# Patient Record
Sex: FEMALE | Race: WHITE | Employment: OTHER | URBAN - METROPOLITAN AREA
[De-identification: names, ages, dates, MRNs, and addresses within clinical notes are randomized per-mention and may not be internally consistent; named-entity substitution may affect disease eponyms.]

---

## 2017-08-16 LAB — COLONOSCOPY, EXTERNAL: NORMAL

## 2020-07-16 PROBLEM — I10 ESSENTIAL HYPERTENSION, MALIGNANT: Status: ACTIVE | Noted: 2020-07-16

## 2020-07-16 PROBLEM — E11.65 TYPE II OR UNSPECIFIED TYPE DIABETES MELLITUS WITH RENAL MANIFESTATIONS, UNCONTROLLED(250.42) (HCC): Status: ACTIVE | Noted: 2020-07-16

## 2020-07-16 PROBLEM — E78.2 MIXED HYPERLIPIDEMIA: Status: ACTIVE | Noted: 2020-07-16

## 2020-07-16 PROBLEM — E11.29 TYPE II OR UNSPECIFIED TYPE DIABETES MELLITUS WITH RENAL MANIFESTATIONS, UNCONTROLLED(250.42) (HCC): Status: ACTIVE | Noted: 2020-07-16

## 2020-07-16 RX ORDER — BENAZEPRIL HYDROCHLORIDE 20 MG/1
20 TABLET ORAL DAILY
COMMUNITY
End: 2020-12-22

## 2020-07-16 RX ORDER — UREA 10 %
100 LOTION (ML) TOPICAL DAILY
COMMUNITY

## 2020-07-16 RX ORDER — INSULIN DETEMIR 100 [IU]/ML
INJECTION, SOLUTION SUBCUTANEOUS
COMMUNITY
End: 2020-07-28 | Stop reason: SDUPTHER

## 2020-07-16 RX ORDER — GUAIFENESIN 100 MG/5ML
81 LIQUID (ML) ORAL DAILY
COMMUNITY
End: 2021-01-12

## 2020-07-16 RX ORDER — SIMVASTATIN 40 MG/1
TABLET, FILM COATED ORAL
COMMUNITY
End: 2020-12-22

## 2020-07-16 RX ORDER — OMEGA-3-ACID ETHYL ESTERS 1 G/1
2 CAPSULE, LIQUID FILLED ORAL 2 TIMES DAILY
COMMUNITY

## 2020-07-16 RX ORDER — CETIRIZINE HYDROCHLORIDE 10 MG/1
CAPSULE, LIQUID FILLED ORAL
COMMUNITY

## 2020-07-16 RX ORDER — METFORMIN HYDROCHLORIDE 1000 MG/1
1000 TABLET ORAL 2 TIMES DAILY WITH MEALS
COMMUNITY
End: 2020-07-28 | Stop reason: SDUPTHER

## 2020-07-16 RX ORDER — LIRAGLUTIDE 6 MG/ML
0.6 INJECTION SUBCUTANEOUS
COMMUNITY
End: 2020-07-28 | Stop reason: SDUPTHER

## 2020-07-28 ENCOUNTER — OFFICE VISIT (OUTPATIENT)
Dept: ENDOCRINOLOGY | Age: 70
End: 2020-07-28
Payer: COMMERCIAL

## 2020-07-28 VITALS
TEMPERATURE: 98.4 F | HEIGHT: 67 IN | HEART RATE: 105 BPM | WEIGHT: 194.9 LBS | BODY MASS INDEX: 30.59 KG/M2 | SYSTOLIC BLOOD PRESSURE: 121 MMHG | OXYGEN SATURATION: 97 % | DIASTOLIC BLOOD PRESSURE: 78 MMHG

## 2020-07-28 DIAGNOSIS — E11.65 UNCONTROLLED TYPE 2 DIABETES MELLITUS WITH HYPERGLYCEMIA (HCC): Primary | ICD-10-CM

## 2020-07-28 LAB
GLUCOSE POC: 158 MG/DL
HBA1C MFR BLD HPLC: 9.1 %

## 2020-07-28 PROCEDURE — 83036 HEMOGLOBIN GLYCOSYLATED A1C: CPT | Performed by: INTERNAL MEDICINE

## 2020-07-28 PROCEDURE — 82962 GLUCOSE BLOOD TEST: CPT | Performed by: INTERNAL MEDICINE

## 2020-07-28 PROCEDURE — 99214 OFFICE O/P EST MOD 30 MIN: CPT | Performed by: INTERNAL MEDICINE

## 2020-07-28 RX ORDER — LIRAGLUTIDE 6 MG/ML
1.8 INJECTION SUBCUTANEOUS DAILY
Qty: 3 ADJUSTABLE DOSE PRE-FILLED PEN SYRINGE | Refills: 4 | Status: SHIPPED | OUTPATIENT
Start: 2020-07-28 | End: 2020-12-21

## 2020-07-28 RX ORDER — LANCETS
EACH MISCELLANEOUS
Qty: 50 EACH | Refills: 5 | Status: SHIPPED | OUTPATIENT
Start: 2020-07-28 | End: 2021-02-02 | Stop reason: SDUPTHER

## 2020-07-28 RX ORDER — INSULIN DETEMIR 100 [IU]/ML
30 INJECTION, SOLUTION SUBCUTANEOUS
Qty: 3 ADJUSTABLE DOSE PRE-FILLED PEN SYRINGE | Refills: 4 | Status: SHIPPED | OUTPATIENT
Start: 2020-07-28 | End: 2020-12-04

## 2020-07-28 RX ORDER — GLIMEPIRIDE 1 MG/1
1 TABLET ORAL 2 TIMES DAILY
Qty: 60 TAB | Refills: 4 | Status: SHIPPED | OUTPATIENT
Start: 2020-07-28 | End: 2020-08-27

## 2020-07-28 RX ORDER — METFORMIN HYDROCHLORIDE 1000 MG/1
1000 TABLET ORAL 2 TIMES DAILY WITH MEALS
Qty: 60 TAB | Refills: 4 | Status: SHIPPED | OUTPATIENT
Start: 2020-07-28 | End: 2020-12-21

## 2020-07-28 NOTE — PATIENT INSTRUCTIONS
Start glimepiride 1 mg twice a day Continue rest of the medications. Check glucose before breakfast and dinner. Start walking indoor everyday for exercise.

## 2020-07-28 NOTE — PROGRESS NOTES
History and Physical    Patient: Arabella Garza MRN: [id number]  SSN: [id number]    YOB: 1950  Age: 72 y.o. Sex: female      Subjective:      Arabella Garza is a 72 y.o. female with past medical history of hypertension, hyperlipidemia is here for follow-up of type 2 diabetes mellitus. She remains in primary care of RUTHY Davis. At the last visit we noticed improvement in glucose control. So we continued Levemir 30 units at bedtime, Victoza 1.8 mg daily, metformin 1000 mg twice a day, Jardiance was increased from 10 mg to 25 mg daily and we totally stopped glimepiride. Patient tells me that her office has been closed, so she has not been physically active like before. She eats 3 meals and 2 snacks every day. Snacking on nuts, cheese. Not drinking any sugary beverages. She checks blood glucose only fasting in the morning. Glucometer reading: Patient is checking blood glucose fasting.   Readings are ranging from 97 to 174 mg/dL    Updated diabetes history:  · Diagnosis: 20 years    · Current treatment: Levemir 30 units at bedtime, Victoza 1.8 mg daily, metformin 1000 mg twice a day, Jardiance 25 mg daily    · Past treatment: Glimepiride    · Glucose checks: fasting daily 200s    · Hyperglycemia: yes    · Hypoglycemia: no    · Meals per day: 3, breakfast: eggs and toast, lunch: salad and sandwich, dinner: meat and veg, starch, bedtime snacks: pie, cereal, snacks: nuts cheese sticks     · Exercise: unable to    · DM related hospitalizations: no        Complications of DM:    · CAD: no    · CVA: no    · PVD: no    · Amputations: no     · Retinopathy: no; last exam was 10/2019, goes every 6 months    · Gastropathy: no    · Nephropathy: no    · Neuropathy: yes        Medications:    · Statin: simvastatin 40    · ACE-I: benazepril    · ASA: yes        · Diabetes education: long time back    Past Medical History:   Diagnosis Date    Essential hypertension, malignant 7/16/2020    Mixed hyperlipidemia 7/16/2020    Type II or unspecified type diabetes mellitus with renal manifestations, uncontrolled(250.42) ([facility] [address] 7/16/2020     Past Surgical History:   Procedure Laterality Date    HX TONSILLECTOMY      HX TUBAL LIGATION        Family History   Problem Relation Age of Onset    Heart Disease Mother     Cancer Father     Diabetes Sister      Social History     Tobacco Use    Smoking status: Never Smoker    Smokeless tobacco: Never Used   Substance Use Topics    Alcohol use: Never     Frequency: Never      Prior to Admission medications    Medication Sig Start Date End Date Taking? Authorizing Provider   empagliflozin (Jardiance) 25 mg tablet Take 1 Tab by mouth daily for 30 days. 7/28/20 8/27/20 Yes Jose De Jesus Farnsworth MD   liraglutide (Victoza 2-Edilson) 0.6 mg/0.1 mL (18 mg/3 mL) pnij 1.8 mg by SubCUTAneous route daily for 30 days. 7/28/20 8/27/20 Yes Jose De Jesus Farnsworth MD   metFORMIN (GLUCOPHAGE) 1,000 mg tablet Take 1 Tab by mouth two (2) times daily (with meals) for 30 days. 7/28/20 8/27/20 Yes Jose De Jesus Farnsworth MD   glimepiride (AMARYL) 1 mg tablet Take 1 Tab by mouth two (2) times a day for 30 days. 7/28/20 8/27/20 Yes Jose De Jesus Farnsworth MD   glucose blood VI test strips (ASCENSIA AUTODISC VI, ONE TOUCH ULTRA TEST VI) strip Use to check glucose twice a day 7/28/20  Yes Jose De Jesus Farnsworth MD   lancets misc Use to check glucose twice a day 7/28/20  Yes Jose De Jesus Farnsworth MD   insulin detemir U-100 (Levemir FlexTouch U-100 Insuln) 100 unit/mL (3 mL) inpn 30 Units by SubCUTAneous route nightly. 7/28/20  Yes Jose De Jesus Farnsworth MD   aspirin 81 mg chewable tablet Take 81 mg by mouth daily. Yes Provider, Historical   benazepriL (LOTENSIN) 20 mg tablet Take 20 mg by mouth daily. Yes Provider, Historical   omega-3 acid ethyl esters (LOVAZA) 1 gram capsule Take 2 g by mouth two (2) times a day. Yes Provider, Historical   simvastatin (ZOCOR) 40 mg tablet Take  by mouth nightly.    Yes Provider, Historical   cyanocobalamin (Vitamin B-12) 100 mcg tablet Take 100 mcg by mouth daily. Yes Provider, Historical   Cetirizine (ZyrTEC) 10 mg cap Take  by mouth. Yes Provider, Historical        Allergies   Allergen Reactions    Flonase [Fluticasone Propionate] Unknown (comments)    Nasonex [Mometasone] Unknown (comments)       Review of Systems:  ROS    A comprehensive review of systems was preformed and it is negative except mentioned in HPI    Objective:     Vitals:    07/28/20 1544   BP: 121/78   Pulse: (!) 105   Temp: 98.4 °F (36.9 °C)   SpO2: 97%   Weight: 194 lb 14.4 oz (88.4 kg)   Height: 5' 7\" (1.702 m)        Physical Exam:    Physical Exam  Vitals signs and nursing note reviewed. Constitutional:       Appearance: Normal appearance. HENT:      Head: Normocephalic and atraumatic. Eyes:      Extraocular Movements: Extraocular movements intact. Cardiovascular:      Rate and Rhythm: Normal rate and regular rhythm. Pulses: Normal pulses. Heart sounds: Normal heart sounds. Neurological:      General: No focal deficit present. Mental Status: She is alert. Psychiatric:         Mood and Affect: Mood normal.         Behavior: Behavior normal.         Thought Content: Thought content normal.         Judgment: Judgment normal.          Labs and Imaging:  Results for Chani Salazar (MRN [id number]) as of 7/28/2020 16:00   Ref.  Range 7/28/2020 15:51 7/28/2020 15:53   GLUCOSE,FAST - POC Latest Units: mg/dL 158    Hemoglobin A1c (POC) Latest Units: %  9.1     Last 3 Recorded Weights in this Encounter    07/28/20 1544   Weight: 194 lb 14.4 oz (88.4 kg)        No results found for: HBA1C, HGBE8, FQF1XCOQ, XOV4GVDP, YMJ0EUFG     Assessment:     Patient Active Problem List   Diagnosis Code    Essential hypertension, malignant I10    Mixed hyperlipidemia E78.2    Type II or unspecified type diabetes mellitus with renal manifestations, uncontrolled(250.42) ([facility] [address] E11.29, E11.65           Plan:     type II diabetes mellitus uncontrolled   Hemoglobin A1c was 8.4% on 11222019, 7.7% on 2020, 9.1% today. Fingerstick blood glucose is 158 mg/dL in my office today. Up to date with diabetes related annual labs: yes  Up to date with diabetic eye exam: yes    plan:  Glucose control has worsened. Fasting glucose looks adequate since past couple weeks. I am not sure if patient had poor glycemic control a couple months back which is now getting better. Also it is possible that patient has prandial hyperglycemia which is not getting captured because she checks only fasting blood glucose. Encouraged patient to continue to follow diabetic diet. Advised patient to start walking every day for exercise. Continue Levemir 30 units daily, metformin 1000 mg twice a day, Jardiance 25 mg daily. Restart glimepiride 1 mg twice a day before meals. Check blood glucose before breakfast and before dinner. mixed hyperlipidemia   LDL 54 in 2019. continue simvastatin 40 mg daily    essential hypertension   blood pressure is acceptable today. No microalbuminuria. Orders Placed This Encounter    AMB POC GLUCOSE BLOOD, BY GLUCOSE MONITORING DEVICE    AMB POC HEMOGLOBIN A1C    empagliflozin (Jardiance) 25 mg tablet     Sig: Take 1 Tab by mouth daily for 30 days. Dispense:  30 Tab     Refill:  4    liraglutide (Victoza 2-Edilson) 0.6 mg/0.1 mL (18 mg/3 mL) pnij     Si.8 mg by SubCUTAneous route daily for 30 days. Dispense:  3 Adjustable Dose Pre-filled Pen Syringe     Refill:  4    metFORMIN (GLUCOPHAGE) 1,000 mg tablet     Sig: Take 1 Tab by mouth two (2) times daily (with meals) for 30 days. Dispense:  60 Tab     Refill:  4    glimepiride (AMARYL) 1 mg tablet     Sig: Take 1 Tab by mouth two (2) times a day for 30 days.      Dispense:  60 Tab     Refill:  4    glucose blood VI test strips (ASCENSIA AUTODISC VI, ONE TOUCH ULTRA TEST VI) strip     Sig: Use to check glucose twice a day     Dispense:  50 Strip     Refill:  5  lancets misc     Sig: Use to check glucose twice a day     Dispense:  50 Each     Refill:  5    insulin detemir U-100 (Levemir FlexTouch U-100 Insuln) 100 unit/mL (3 mL) inpn     Si Units by SubCUTAneous route nightly.      Dispense:  3 Adjustable Dose Pre-filled Pen Syringe     Refill:  4        Signed By: Aurora New MD     2020      Return to clinic 4 months

## 2020-08-04 ENCOUNTER — TELEPHONE (OUTPATIENT)
Dept: FAMILY MEDICINE CLINIC | Age: 70
End: 2020-08-04

## 2020-08-04 NOTE — TELEPHONE ENCOUNTER
Pt called stating that she's had a diabetic eye bleed in the (R) eye and Dr. Jacey Alvarado needs to know she can stop the 81mg Aspirin

## 2020-12-04 ENCOUNTER — TELEPHONE (OUTPATIENT)
Dept: INFECTIOUS DISEASES | Age: 70
End: 2020-12-04

## 2020-12-21 DIAGNOSIS — E11.65 UNCONTROLLED TYPE 2 DIABETES MELLITUS WITH HYPERGLYCEMIA (HCC): ICD-10-CM

## 2020-12-21 RX ORDER — METFORMIN HYDROCHLORIDE 1000 MG/1
TABLET ORAL
Qty: 60 TAB | Refills: 0 | Status: SHIPPED | OUTPATIENT
Start: 2020-12-21 | End: 2021-01-13 | Stop reason: SDUPTHER

## 2020-12-21 RX ORDER — EMPAGLIFLOZIN 25 MG/1
TABLET, FILM COATED ORAL
Qty: 30 TAB | Refills: 0 | Status: SHIPPED | OUTPATIENT
Start: 2020-12-21 | End: 2021-01-13 | Stop reason: SDUPTHER

## 2020-12-21 RX ORDER — LIRAGLUTIDE 6 MG/ML
INJECTION SUBCUTANEOUS
Qty: 9 ML | Refills: 0 | Status: SHIPPED | OUTPATIENT
Start: 2020-12-21 | End: 2021-02-02 | Stop reason: SDUPTHER

## 2021-01-12 ENCOUNTER — OFFICE VISIT (OUTPATIENT)
Dept: FAMILY MEDICINE CLINIC | Age: 71
End: 2021-01-12
Payer: MEDICARE

## 2021-01-12 VITALS
BODY MASS INDEX: 31.39 KG/M2 | HEIGHT: 67 IN | WEIGHT: 200 LBS | TEMPERATURE: 97.1 F | SYSTOLIC BLOOD PRESSURE: 120 MMHG | HEART RATE: 91 BPM | DIASTOLIC BLOOD PRESSURE: 72 MMHG | OXYGEN SATURATION: 96 %

## 2021-01-12 DIAGNOSIS — Z13.39 ALCOHOL SCREENING: ICD-10-CM

## 2021-01-12 DIAGNOSIS — Z00.00 MEDICARE ANNUAL WELLNESS VISIT, INITIAL: Primary | ICD-10-CM

## 2021-01-12 DIAGNOSIS — Z13.6 SCREENING FOR CARDIOVASCULAR CONDITION: ICD-10-CM

## 2021-01-12 DIAGNOSIS — I10 ESSENTIAL HYPERTENSION, MALIGNANT: ICD-10-CM

## 2021-01-12 DIAGNOSIS — E53.8 COBALAMIN DEFICIENCY: ICD-10-CM

## 2021-01-12 DIAGNOSIS — Z91.81 AT LOW RISK FOR FALL: ICD-10-CM

## 2021-01-12 DIAGNOSIS — E78.2 MIXED HYPERLIPIDEMIA: ICD-10-CM

## 2021-01-12 DIAGNOSIS — Z71.89 ADVANCED DIRECTIVES, COUNSELING/DISCUSSION: ICD-10-CM

## 2021-01-12 DIAGNOSIS — Z12.31 ENCOUNTER FOR SCREENING MAMMOGRAM FOR MALIGNANT NEOPLASM OF BREAST: ICD-10-CM

## 2021-01-12 DIAGNOSIS — Z13.31 DEPRESSION SCREENING: ICD-10-CM

## 2021-01-12 DIAGNOSIS — E66.09 CLASS 1 OBESITY DUE TO EXCESS CALORIES WITH SERIOUS COMORBIDITY AND BODY MASS INDEX (BMI) OF 31.0 TO 31.9 IN ADULT: ICD-10-CM

## 2021-01-12 PROCEDURE — G8752 SYS BP LESS 140: HCPCS | Performed by: NURSE PRACTITIONER

## 2021-01-12 PROCEDURE — G9899 SCRN MAM PERF RSLTS DOC: HCPCS | Performed by: NURSE PRACTITIONER

## 2021-01-12 PROCEDURE — 3017F COLORECTAL CA SCREEN DOC REV: CPT | Performed by: NURSE PRACTITIONER

## 2021-01-12 PROCEDURE — 99214 OFFICE O/P EST MOD 30 MIN: CPT | Performed by: NURSE PRACTITIONER

## 2021-01-12 PROCEDURE — G8400 PT W/DXA NO RESULTS DOC: HCPCS | Performed by: NURSE PRACTITIONER

## 2021-01-12 PROCEDURE — G8536 NO DOC ELDER MAL SCRN: HCPCS | Performed by: NURSE PRACTITIONER

## 2021-01-12 PROCEDURE — 1090F PRES/ABSN URINE INCON ASSESS: CPT | Performed by: NURSE PRACTITIONER

## 2021-01-12 PROCEDURE — 1101F PT FALLS ASSESS-DOCD LE1/YR: CPT | Performed by: NURSE PRACTITIONER

## 2021-01-12 PROCEDURE — G8427 DOCREV CUR MEDS BY ELIG CLIN: HCPCS | Performed by: NURSE PRACTITIONER

## 2021-01-12 PROCEDURE — G8432 DEP SCR NOT DOC, RNG: HCPCS | Performed by: NURSE PRACTITIONER

## 2021-01-12 PROCEDURE — G8417 CALC BMI ABV UP PARAM F/U: HCPCS | Performed by: NURSE PRACTITIONER

## 2021-01-12 PROCEDURE — G0402 INITIAL PREVENTIVE EXAM: HCPCS | Performed by: NURSE PRACTITIONER

## 2021-01-12 PROCEDURE — G8754 DIAS BP LESS 90: HCPCS | Performed by: NURSE PRACTITIONER

## 2021-01-12 RX ORDER — GLIMEPIRIDE 1 MG/1
TABLET ORAL
COMMUNITY
Start: 2020-12-22 | End: 2021-01-12

## 2021-01-12 RX ORDER — GLIMEPIRIDE 1 MG/1
1 TABLET ORAL 2 TIMES DAILY
COMMUNITY
End: 2021-02-02 | Stop reason: SDUPTHER

## 2021-01-12 NOTE — PROGRESS NOTES
Medicare Wellness Exam:    Chief Complaint   Patient presents with    Annual Wellness Visit     she is a 72y.o. year old female who presents for evaluation for their Medicare Wellness Visit. Patient presents for Medicare wellness and management of hypertension and hyperlipidemia. Medications reviewed, taking as prescribed with no known side effects. Follows with endocrinology for management of type 2 diabetes including all lab work. Simvastatin daily in the evening for hyperlipidemia. Moderate intake of fat/cholesterol. Management of HTN-  Current meds: Benazepril  Home BP readings: 120/70  High salt intake: no  Stays hydrated: no, mostly diet coke and tea  Regular exercise: no      Fall Screen is completed and assessed=yes  Depression Screen is completed and assessed=yes  Medication list reviewed and adjusted for accuracy=yes  Immunizations reviewed and updated=yes  Health/Preventative Screenings reviewed and updated=yes  ADL Functions reviewed=yes  MiniCog score= 5/5 (2points for clock, 3/3 for recall)  See scanned medicare wellness documents for full details. Patient Active Problem List    Diagnosis    Cobalamin deficiency    Moderate nonproliferative diabetic retinopathy of both eyes without macular edema associated with type 2 diabetes mellitus ([address]    Essential hypertension, malignant    Mixed hyperlipidemia    Type II or unspecified type diabetes mellitus with renal manifestations, uncontrolled(250.42) ([address]       Reviewed PmHx, RxHx, FmHx, SocHx, AllgHx and updated and dated in the chart. Review of Systems   Constitutional: Negative for chills, fever and weight loss. HENT: Negative for congestion, ear pain, hearing loss, sinus pain and sore throat. Denies difficulty swallowing. Eyes: Negative for blurred vision. Respiratory: Negative for cough, shortness of breath and wheezing.     Cardiovascular: Negative for chest pain, palpitations, claudication and leg swelling. Gastrointestinal: Negative for abdominal pain, constipation, diarrhea and heartburn. Genitourinary: Negative for dysuria. Musculoskeletal: Positive for back pain (low, has seen ortho and PT in the past). Negative for joint pain and myalgias. Neurological: Negative for dizziness, tingling, weakness and headaches. Psychiatric/Behavioral: Negative for depression. The patient is not nervous/anxious. Objective:     Vitals:    01/12/21 1118   BP: 120/72   Pulse: 91   Temp: 97.1 °F (36.2 °C)   TempSrc: Temporal   SpO2: 96%   Weight: 200 lb (90.7 kg)   Height: 5' 7\" (1.702 m)     Physical Exam  Vitals signs and nursing note reviewed. Constitutional:       General: She is not in acute distress. Appearance: Normal appearance. She is obese. HENT:      Head: Normocephalic. Eyes:      Extraocular Movements: Extraocular movements intact. Neck:      Musculoskeletal: Neck supple. Thyroid: No thyroid mass, thyromegaly or thyroid tenderness. Cardiovascular:      Rate and Rhythm: Normal rate and regular rhythm. Heart sounds: Normal heart sounds. Pulmonary:      Effort: Pulmonary effort is normal.      Breath sounds: Normal breath sounds. Musculoskeletal: Normal range of motion. Right lower leg: No edema. Left lower leg: No edema. Lymphadenopathy:      Cervical: No cervical adenopathy. Upper Body:      Right upper body: No supraclavicular adenopathy. Left upper body: No supraclavicular adenopathy. Skin:     General: Skin is warm and dry. Neurological:      Mental Status: She is alert and oriented to person, place, and time. Psychiatric:         Mood and Affect: Mood normal.         Behavior: Behavior normal.          Assessment/ Plan:   Diagnoses and all orders for this visit:    1. Medicare annual wellness visit, initial  W exam completed as documented. -     VISUAL ACUITY CHECK    2. Depression screening  Negative depression screening.     3. Alcohol screening  Low risk for alcohol misuse. 4. At low risk for fall  Low fall risk. 5. Advanced directives, counseling/discussion  Advised to bring a copy of her advance directive to the office at her convenience to have this part of her permanent medical record. 6. Screening for cardiovascular condition  Declines EKG today. 7. Class 1 obesity due to excess calories with serious comorbidity and body mass index (BMI) of 31.0 to 31.9 in adult  Discussed the positive impact regular exercise and weight loss will have chronic conditions and overall health. 8. Encounter for screening mammogram for malignant neoplasm of breast  Normal in December. 9. Essential hypertension, malignant  BP is below goal in the office today and on reported home readings. No medication changes. Check BP readings 1 to 2 hours after taking medication and after sitting quietly for 5 minutes with both feet flat on the floor and arm at heart level. Call if readings are consistently greater than 140/90 on either number. Increase regular exercise, limit salt intake, and stay well-hydrated. 10. Mixed hyperlipidemia  Continue to take statin daily in the evening and limit fat and cholesterol in diet. 11. Cobalamin deficiency  Takes B12 supplement daily.   Normal when last checked in 2019.       -Pain evaluation performed in office  -Cognitive Screen performed in office  -Depression Screen, Fall risks (by up and go test)  and ADL functionality were addressed  -Medication list updated and reviewed for any changes   -A comprehensive review of medical issues and a plan was formulated  -End of life planning was addressed with pt   -Health Screenings for preventions were addressed and a plan was formulated  -Shingles Vaccine was recommended  -Discussed with patient cancer risk factors and appropriate screenings for age  -Patient evaluated for colonoscopy and referred if needed per screeing criteria  -Labs from previous visits were discussed with patient   -Discussed with patient diet and exercise and formulated a plan as needed  -An Advanced care plan was developed with the patient.  -Alcohol screening performed and was negative    -  Follow-up and Dispositions    · Return in about 6 months (around 7/12/2021) for follow up, hypertension, nonfasting, VV ok. I have discussed the diagnosis with the patient and the intended plan as seen in the above orders. The patient understands and agrees with the plan. The patient has received an after-visit summary and questions were answered concerning future plans.      Medication Side Effects and Warnings were discussed with patien  Patient Labs were reviewed and or requested  Patient Past Records were reviewed and or requested        Lyn BLISS-TRACY

## 2021-01-12 NOTE — PATIENT INSTRUCTIONS
Medicare Wellness Visit, Female The best way to live healthy is to have a lifestyle where you eat a well-balanced diet, exercise regularly, limit alcohol use, and quit all forms of tobacco/nicotine, if applicable. Regular preventive services are another way to keep healthy. Preventive services (vaccines, screening tests, monitoring & exams) can help personalize your care plan, which helps you manage your own care. Screening tests can find health problems at the earliest stages, when they are easiest to treat. [facility] follows the current, evidence-based guidelines published by the [facility] Jai Perez ([facility]) when recommending preventive services for our patients. Because we follow these guidelines, sometimes recommendations change over time as research supports it. (For example, mammograms used to be recommended annually. Even though Medicare will still pay for an annual mammogram, the newer guidelines recommend a mammogram every two years for women of average risk). Of course, you and your doctor may decide to screen more often for some diseases, based on your risk and your co-morbidities (chronic disease you are already diagnosed with). Preventive services for you include: - Medicare offers their members a free annual wellness visit, which is time for you and your primary care provider to discuss and plan for your preventive service needs. Take advantage of this benefit every year! 
-All adults over the age of 72 should receive the recommended pneumonia vaccines. Current USPSTF guidelines recommend a series of two vaccines for the best pneumonia protection.  
-All adults should have a flu vaccine yearly and a tetanus vaccine every 10 years.  
-All adults age 48 and older should receive the shingles vaccines (series of two vaccines). -All adults age 38-68 who are overweight should have a diabetes screening test once every three years. -All adults born between [birth year] and 1964 should be screened once for Hepatitis C. 
-Other screening tests and preventive services for persons with diabetes include: an eye exam to screen for diabetic retinopathy, a kidney function test, a foot exam, and stricter control over your cholesterol.  
-Cardiovascular screening for adults with routine risk involves an electrocardiogram (ECG) at intervals determined by your doctor.  
-Colorectal cancer screenings should be done for adults age 54-65 with no increased risk factors for colorectal cancer. There are a number of acceptable methods of screening for this type of cancer. Each test has its own benefits and drawbacks. Discuss with your doctor what is most appropriate for you during your annual wellness visit. The different tests include: colonoscopy (considered the best screening method), a fecal occult blood test, a fecal DNA test, and sigmoidoscopy. 
 
-A bone mass density test is recommended when a woman turns 65 to screen for osteoporosis. This test is only recommended one time, as a screening. Some providers will use this same test as a disease monitoring tool if you already have osteoporosis. -Breast cancer screenings are recommended every other year for women of normal risk, age 54-69. 
-Cervical cancer screenings for women over age 72 are only recommended with certain risk factors. Here is a list of your current Health Maintenance items (your personalized list of preventive services) with a due date: 
Health Maintenance Due Topic Date Due  
 Diabetic Foot Care  03/10/1961  Albumin Urine Test  03/10/1961  Cholesterol Test   03/10/1961  Colorectal Screening  03/10/2001  Bone Mineral Density   03/10/2016  Hemoglobin A1C    10/28/2020

## 2021-01-13 DIAGNOSIS — E11.65 UNCONTROLLED TYPE 2 DIABETES MELLITUS WITH HYPERGLYCEMIA (HCC): ICD-10-CM

## 2021-01-13 NOTE — TELEPHONE ENCOUNTER
Requested Prescriptions     Pending Prescriptions Disp Refills    metFORMIN (GLUCOPHAGE) 1,000 mg tablet 60 Tab 0     Sig: TAKE 1 TABLET BY MOUTH TWICE DAILY WITH MEALS FOR 30 DAYS
no

## 2021-01-13 NOTE — TELEPHONE ENCOUNTER
Requested Prescriptions     Pending Prescriptions Disp Refills    empagliflozin (Jardiance) 25 mg tablet 30 Tab 0     Sig: Take 1 tablet by mouth once daily for 30 days

## 2021-01-14 RX ORDER — METFORMIN HYDROCHLORIDE 1000 MG/1
TABLET ORAL
Qty: 60 TAB | Refills: 0 | Status: SHIPPED | OUTPATIENT
Start: 2021-01-14 | End: 2021-02-02 | Stop reason: SDUPTHER

## 2021-02-02 ENCOUNTER — OFFICE VISIT (OUTPATIENT)
Dept: ENDOCRINOLOGY | Age: 71
End: 2021-02-02
Payer: MEDICARE

## 2021-02-02 VITALS
OXYGEN SATURATION: 97 % | TEMPERATURE: 96.9 F | WEIGHT: 198.5 LBS | HEIGHT: 66 IN | HEART RATE: 109 BPM | DIASTOLIC BLOOD PRESSURE: 71 MMHG | SYSTOLIC BLOOD PRESSURE: 107 MMHG | BODY MASS INDEX: 31.9 KG/M2

## 2021-02-02 DIAGNOSIS — Z79.4 TYPE 2 DIABETES MELLITUS WITH HYPERGLYCEMIA, WITH LONG-TERM CURRENT USE OF INSULIN (HCC): Primary | ICD-10-CM

## 2021-02-02 DIAGNOSIS — E11.65 TYPE 2 DIABETES MELLITUS WITH HYPERGLYCEMIA, WITH LONG-TERM CURRENT USE OF INSULIN (HCC): Primary | ICD-10-CM

## 2021-02-02 DIAGNOSIS — E11.65 UNCONTROLLED TYPE 2 DIABETES MELLITUS WITH HYPERGLYCEMIA (HCC): ICD-10-CM

## 2021-02-02 DIAGNOSIS — E11.3393 MODERATE NONPROLIFERATIVE DIABETIC RETINOPATHY OF BOTH EYES WITHOUT MACULAR EDEMA ASSOCIATED WITH TYPE 2 DIABETES MELLITUS (HCC): ICD-10-CM

## 2021-02-02 LAB
GLUCOSE POC: 102 MG/DL
HBA1C MFR BLD HPLC: 8.1 %

## 2021-02-02 PROCEDURE — G8754 DIAS BP LESS 90: HCPCS | Performed by: INTERNAL MEDICINE

## 2021-02-02 PROCEDURE — G8432 DEP SCR NOT DOC, RNG: HCPCS | Performed by: INTERNAL MEDICINE

## 2021-02-02 PROCEDURE — G8427 DOCREV CUR MEDS BY ELIG CLIN: HCPCS | Performed by: INTERNAL MEDICINE

## 2021-02-02 PROCEDURE — 2022F DILAT RTA XM EVC RTNOPTHY: CPT | Performed by: INTERNAL MEDICINE

## 2021-02-02 PROCEDURE — G8417 CALC BMI ABV UP PARAM F/U: HCPCS | Performed by: INTERNAL MEDICINE

## 2021-02-02 PROCEDURE — 99214 OFFICE O/P EST MOD 30 MIN: CPT | Performed by: INTERNAL MEDICINE

## 2021-02-02 PROCEDURE — 82962 GLUCOSE BLOOD TEST: CPT | Performed by: INTERNAL MEDICINE

## 2021-02-02 PROCEDURE — 1101F PT FALLS ASSESS-DOCD LE1/YR: CPT | Performed by: INTERNAL MEDICINE

## 2021-02-02 PROCEDURE — 1090F PRES/ABSN URINE INCON ASSESS: CPT | Performed by: INTERNAL MEDICINE

## 2021-02-02 PROCEDURE — 83036 HEMOGLOBIN GLYCOSYLATED A1C: CPT | Performed by: INTERNAL MEDICINE

## 2021-02-02 PROCEDURE — 3052F HG A1C>EQUAL 8.0%<EQUAL 9.0%: CPT | Performed by: INTERNAL MEDICINE

## 2021-02-02 PROCEDURE — G8536 NO DOC ELDER MAL SCRN: HCPCS | Performed by: INTERNAL MEDICINE

## 2021-02-02 PROCEDURE — 3017F COLORECTAL CA SCREEN DOC REV: CPT | Performed by: INTERNAL MEDICINE

## 2021-02-02 PROCEDURE — G9899 SCRN MAM PERF RSLTS DOC: HCPCS | Performed by: INTERNAL MEDICINE

## 2021-02-02 PROCEDURE — G8752 SYS BP LESS 140: HCPCS | Performed by: INTERNAL MEDICINE

## 2021-02-02 PROCEDURE — G8400 PT W/DXA NO RESULTS DOC: HCPCS | Performed by: INTERNAL MEDICINE

## 2021-02-02 RX ORDER — INSULIN DETEMIR 100 [IU]/ML
30 INJECTION, SOLUTION SUBCUTANEOUS
Qty: 3 ADJUSTABLE DOSE PRE-FILLED PEN SYRINGE | Refills: 3 | Status: SHIPPED | OUTPATIENT
Start: 2021-02-02 | End: 2021-03-04

## 2021-02-02 RX ORDER — GLIMEPIRIDE 1 MG/1
1 TABLET ORAL 2 TIMES DAILY
Qty: 30 TAB | Refills: 3 | Status: SHIPPED | OUTPATIENT
Start: 2021-02-02 | End: 2021-02-04 | Stop reason: SDUPTHER

## 2021-02-02 RX ORDER — ALCOHOL ANTISEPTIC PADS
PADS, MEDICATED (EA) TOPICAL
Qty: 100 EACH | Refills: 3 | Status: SHIPPED | OUTPATIENT
Start: 2021-02-02 | End: 2021-05-18 | Stop reason: SDUPTHER

## 2021-02-02 RX ORDER — LANCETS
EACH MISCELLANEOUS
Qty: 100 EACH | Refills: 5 | Status: SHIPPED | OUTPATIENT
Start: 2021-02-02 | End: 2021-03-02 | Stop reason: ALTCHOICE

## 2021-02-02 RX ORDER — LIRAGLUTIDE 6 MG/ML
INJECTION SUBCUTANEOUS
Qty: 3 ADJUSTABLE DOSE PRE-FILLED PEN SYRINGE | Refills: 3 | Status: SHIPPED | OUTPATIENT
Start: 2021-02-02 | End: 2021-05-18 | Stop reason: SDUPTHER

## 2021-02-02 RX ORDER — METFORMIN HYDROCHLORIDE 1000 MG/1
TABLET ORAL
Qty: 60 TAB | Refills: 0 | Status: SHIPPED | OUTPATIENT
Start: 2021-02-02 | End: 2021-03-05

## 2021-02-02 NOTE — PATIENT INSTRUCTIONS
Limit yourself to only 1 starch per meal (bread/rice/potato/sweet potato/corn/pasta). Avoid all fruit juice, bananas, grapes, pineapple, watermelon, fruit cups, fruit cocktails. Check blood glucose 3 times a day, before meals.  
 
Always take glimepiride before a meal.

## 2021-02-02 NOTE — PROGRESS NOTES
History and Physical    Patient: Maegan Okeefe MRN: [id number]  SSN: [id number]    YOB: 1950  Age: 72 y.o. Sex: female      Subjective:      Maegan Okeefe is a 72 y.o. female with past medical history of hypertension, hyperlipidemia is here for follow-up of type 2 diabetes mellitus. She remains in primary care of RUTHY Colon. Patient was last seen in July 2020. After the last visit patient was diagnosed with diabetic retinopathy, she had bleeding behind the right eye and she had to laser surgery since then. She continues to be on Levemir 30 units at bedtime, Victoza 1.8 mg daily, metformin 1000 mg twice a day, Jardiance 25 mg daily. She was started on Amaryl 1 mg twice a day at the last visit. She takes Amaryl in the morning, before breakfast and then at bedtime. Sometimes her fasting readings have been in the 80s. She checks blood glucose only fasting. Rarely checks a second time. Patient admits that although she eats healthy meals but she has been snacking a lot in between meals on cookies, etc. because she takes care of her granddaughter.     Glucometer reading: Patient did not bring glucometer today    Updated diabetes history:  · Diagnosis: 20 years    · Current treatment: Levemir 30 units at bedtime, Victoza 1.8 mg daily, metformin 1000 mg twice a day, Jardiance 25 mg daily, glimepiride 1 mg twice a day    · Past treatment: Glimepiride    · Glucose checks: fasting daily 200s    · Hyperglycemia: yes    · Hypoglycemia: no    · Meals per day: 3, breakfast: eggs and toast, lunch: salad and sandwich, dinner: meat and veg, starch, bedtime snacks: pie, cereal, snacks: nuts cheese sticks     · Exercise: unable to    · DM related hospitalizations: no        Complications of DM:    · CAD: no    · CVA: no    · PVD: no    · Amputations: no     · Retinopathy: yes definitive diabetic retinopathy right eye, moderate nonproliferative diabetic retinopathy left eye, last exam was November 2020, panretinal photocoagulation was recommended    · Gastropathy: no    · Nephropathy: no    · Neuropathy: yes        Medications:    · Statin: simvastatin 40    · ACE-I: benazepril    · ASA: yes        · Diabetes education: long time back    Past Medical History:   Diagnosis Date    Essential hypertension, malignant     History of mammogram 12/15/2020    Mixed hyperlipidemia     Type II or unspecified type diabetes mellitus with renal manifestations, uncontrolled(250.42) ([facility]      Past Surgical History:   Procedure Laterality Date    HX COLONOSCOPY  08/16/2017    and 6/7/2005    HX TONSILLECTOMY      HX TUBAL LIGATION        Family History   Problem Relation Age of Onset    Heart Disease Mother     Heart Failure Mother     Cancer Father         Lung    Atrial Fibrillation Father     Diabetes Sister     Diabetes Maternal Aunt     Hypertension Paternal Aunt      Social History     Tobacco Use    Smoking status: Never Smoker    Smokeless tobacco: Never Used   Substance Use Topics    Alcohol use: Never     Frequency: Never      Prior to Admission medications    Medication Sig Start Date End Date Taking? Authorizing Provider   metFORMIN (GLUCOPHAGE) 1,000 mg tablet TAKE 1 TABLET BY MOUTH TWICE DAILY WITH MEALS FOR 30 DAYS 2/2/21  Yes Arik Tomlin MD   empagliflozin (Jardiance) 25 mg tablet Take 1 tablet by mouth once daily for 30 days 2/2/21  Yes Arik Tomlin MD   glimepiride (AMARYL) 1 mg tablet Take 1 Tab by mouth two (2) times a day for 30 doses. 2/2/21 2/17/21 Yes Arik Tomlin MD   insulin detemir U-100 (Levemir FlexTouch U-100 Insuln) 100 unit/mL (3 mL) inpn 30 Units by SubCUTAneous route nightly for 30 days.  2/2/21 3/4/21 Yes Arik Tomlin MD   liraglutide (Victoza 2-Edilson) 0.6 mg/0.1 mL (18 mg/3 mL) pnij Inject 1.8 mg daily 2/2/21  Yes Arik Tomlin MD   pen needle, diabetic (Comfort EZ Pen Needles) 33 gauge x 1/4\" ndle Twice a day 2/2/21  Yes Arik Tomlin MD   glucose blood VI test strips (ASCENSIA AUTODISC VI, ONE TOUCH ULTRA TEST VI) strip Use to check glucose 3 times a day 2/2/21  Yes Mateusz Dumont MD   lancets misc Use to check glucose 3 times a day 2/2/21  Yes Mateusz Dumont MD   benazepriL (LOTENSIN) 20 mg tablet Take 1 tablet by mouth once daily 12/22/20  Yes Maxime Colindres NP   simvastatin (ZOCOR) 40 mg tablet TAKE 1 TABLET BY MOUTH ONCE DAILY IN THE EVENING 12/22/20  Yes Maxime Colindres NP   omega-3 acid ethyl esters (LOVAZA) 1 gram capsule Take 2 g by mouth two (2) times a day. Yes Provider, Historical   cyanocobalamin (Vitamin B-12) 100 mcg tablet Take 100 mcg by mouth daily. Yes Provider, Historical   Cetirizine (ZyrTEC) 10 mg cap Take  by mouth. Yes Provider, Historical        Allergies   Allergen Reactions    Flonase [Fluticasone Propionate] Unknown (comments)    Nasonex [Mometasone] Unknown (comments)       Review of Systems:  ROS    A comprehensive review of systems was preformed and it is negative except mentioned in HPI    Objective:     Vitals:    02/02/21 1350   BP: 107/71   Pulse: (!) 109   Temp: 96.9 °F (36.1 °C)   TempSrc: Temporal   SpO2: 97%   Weight: 198 lb 8 oz (90 kg)   Height: 5' 6\" (1.676 m)        Physical Exam:    Physical Exam  Vitals signs and nursing note reviewed. Constitutional:       Appearance: Normal appearance. HENT:      Head: Normocephalic and atraumatic. Eyes:      Extraocular Movements: Extraocular movements intact. Neurological:      General: No focal deficit present. Mental Status: She is alert. Psychiatric:         Mood and Affect: Mood normal.         Behavior: Behavior normal.         Thought Content:  Thought content normal.         Judgment: Judgment normal.          Labs and Imaging:      Last 3 Recorded Weights in this Encounter    02/02/21 1350   Weight: 198 lb 8 oz (90 kg)        No results found for: HBA1C, HGBE8, XVW0BXIC, TGA8VJJE, BNX9OZQV     Assessment:     Patient Active Problem List   Diagnosis Code    Essential hypertension, malignant I10    Mixed hyperlipidemia E78.2    Type II or unspecified type diabetes mellitus with renal manifestations, uncontrolled(250.42) ([facility] E11.29, E11.65    Moderate nonproliferative diabetic retinopathy of both eyes without macular edema associated with type 2 diabetes mellitus ([facility] T68.2235    Cobalamin deficiency E53.8           Plan:     type II diabetes mellitus uncontrolled   Hemoglobin A1c was 8.4% on 81493451, 7.7% on 2-, 9.1% on 7-, 8.1% today. Fingerstick blood glucose is 105 mg/dL in my office today. Up to date with diabetes related annual labs: yes  Up to date with diabetic eye exam: yes    plan:  Discussed with patient again about following diabetic diet. Per patient, fasting readings are good, so I will not increase Levemir otherwise it will cause fasting hypoglycemia. Continue Levemir 30 units at bedtime. We have maximize Jardiance Victoza and Metformin. Continue the same. Advised patient to always take glimepiride before a meal.  Advised patient to cut down on snacking, discussed healthy snacking if needed, avoid all sugary beverages, cut down on starches. Check blood glucose 3 times a day for next 1 month and bring glucose log to next visit. After I review her glucose log, I will consider starting her on prandial insulin if needed. Get labs done prior to next visit. mixed hyperlipidemia   LDL 54 in November 2019. continue simvastatin 40 mg daily. Check lipid profile prior to next visit.    essential hypertension   blood pressure is acceptable today. No microalbuminuria. Diabetic retinopathy:  S/p laser surgery right eye.     Orders Placed This Encounter    LIPID PANEL     Standing Status:   Future     Standing Expiration Date:   2/0/1589    METABOLIC PANEL, COMPREHENSIVE     Standing Status:   Future     Standing Expiration Date:   8/2/2021    TSH RFX ON ABNORMAL TO FREE T4     Standing Status:   Future     Standing Expiration Date:   2021    MICROALBUMIN, UR, RAND W/ MICROALB/CREAT RATIO     Standing Status:   Future     Standing Expiration Date:   2021    AMB POC GLUCOSE BLOOD, BY GLUCOSE MONITORING DEVICE    AMB POC HEMOGLOBIN A1C    metFORMIN (GLUCOPHAGE) 1,000 mg tablet     Sig: TAKE 1 TABLET BY MOUTH TWICE DAILY WITH MEALS FOR 30 DAYS     Dispense:  60 Tab     Refill:  0    empagliflozin (Jardiance) 25 mg tablet     Sig: Take 1 tablet by mouth once daily for 30 days     Dispense:  30 Tab     Refill:  0    glimepiride (AMARYL) 1 mg tablet     Sig: Take 1 Tab by mouth two (2) times a day for 30 doses. Dispense:  30 Tab     Refill:  3    insulin detemir U-100 (Levemir FlexTouch U-100 Insuln) 100 unit/mL (3 mL) inpn     Si Units by SubCUTAneous route nightly for 30 days.      Dispense:  3 Adjustable Dose Pre-filled Pen Syringe     Refill:  3     Patient needs appointment    liraglutide (Victoza 2-Edilson) 0.6 mg/0.1 mL (18 mg/3 mL) pnij     Sig: Inject 1.8 mg daily     Dispense:  3 Adjustable Dose Pre-filled Pen Syringe     Refill:  3    pen needle, diabetic (Comfort EZ Pen Needles) 33 gauge x 1/4\" ndle     Sig: Twice a day     Dispense:  100 Each     Refill:  3    glucose blood VI test strips (ASCENSIA AUTODISC VI, ONE TOUCH ULTRA TEST VI) strip     Sig: Use to check glucose 3 times a day     Dispense:  100 Strip     Refill:  5    lancets misc     Sig: Use to check glucose 3 times a day     Dispense:  100 Each     Refill:  5        Signed By: Teressa Moran MD     2021      Return to clinic 1 month

## 2021-02-02 NOTE — LETTER
2/2/2021 Patient: Nohemi Franco YOB: 1950 Date of Visit: 2/2/2021 Pete Simon NP 
[address] [place] [address] [address] [id number] [place] [address] [address] [employer] Dear Pete Simon NP, Thank you for referring Ms. Khoa Clark to [address] [place] [address] ENDOCRINOLOGY for evaluation. My notes for this consultation are attached. If you have questions, please do not hesitate to call me. I look forward to following your patient along with you. Sincerely, Alexey Pedro MD

## 2021-02-04 DIAGNOSIS — Z79.4 TYPE 2 DIABETES MELLITUS WITH HYPERGLYCEMIA, WITH LONG-TERM CURRENT USE OF INSULIN (HCC): ICD-10-CM

## 2021-02-04 DIAGNOSIS — E11.65 TYPE 2 DIABETES MELLITUS WITH HYPERGLYCEMIA, WITH LONG-TERM CURRENT USE OF INSULIN (HCC): ICD-10-CM

## 2021-02-04 RX ORDER — GLIMEPIRIDE 1 MG/1
1 TABLET ORAL 2 TIMES DAILY
Qty: 60 TAB | Refills: 3 | Status: SHIPPED | OUTPATIENT
Start: 2021-02-04 | End: 2021-02-19

## 2021-02-24 LAB
ALBUMIN SERPL-MCNC: 4.2 G/DL (ref 3.8–4.8)
ALBUMIN/CREAT UR: <5 MG/G CREAT (ref 0–29)
ALBUMIN/GLOB SERPL: 2 {RATIO} (ref 1.2–2.2)
ALP SERPL-CCNC: 90 IU/L (ref 39–117)
ALT SERPL-CCNC: 8 IU/L (ref 0–32)
AST SERPL-CCNC: 11 IU/L (ref 0–40)
BILIRUB SERPL-MCNC: 0.7 MG/DL (ref 0–1.2)
BUN SERPL-MCNC: 22 MG/DL (ref 8–27)
BUN/CREAT SERPL: 28 (ref 12–28)
CALCIUM SERPL-MCNC: 9.8 MG/DL (ref 8.7–10.3)
CHLORIDE SERPL-SCNC: 103 MMOL/L (ref 96–106)
CHOLEST SERPL-MCNC: 142 MG/DL (ref 100–199)
CO2 SERPL-SCNC: 20 MMOL/L (ref 20–29)
CREAT SERPL-MCNC: 0.79 MG/DL (ref 0.57–1)
CREAT UR-MCNC: 61.9 MG/DL
GLOBULIN SER CALC-MCNC: 2.1 G/DL (ref 1.5–4.5)
GLUCOSE SERPL-MCNC: 225 MG/DL (ref 65–99)
HDLC SERPL-MCNC: 51 MG/DL
LDLC SERPL CALC-MCNC: 65 MG/DL (ref 0–99)
MICROALBUMIN UR-MCNC: <3 UG/ML
POTASSIUM SERPL-SCNC: 4.4 MMOL/L (ref 3.5–5.2)
PROT SERPL-MCNC: 6.3 G/DL (ref 6–8.5)
SODIUM SERPL-SCNC: 142 MMOL/L (ref 134–144)
TRIGL SERPL-MCNC: 154 MG/DL (ref 0–149)
TSH SERPL DL<=0.005 MIU/L-ACNC: 1.13 UIU/ML (ref 0.45–4.5)
VLDLC SERPL CALC-MCNC: 26 MG/DL (ref 5–40)

## 2021-03-02 ENCOUNTER — OFFICE VISIT (OUTPATIENT)
Dept: ENDOCRINOLOGY | Age: 71
End: 2021-03-02
Payer: MEDICARE

## 2021-03-02 VITALS
DIASTOLIC BLOOD PRESSURE: 68 MMHG | WEIGHT: 198.8 LBS | OXYGEN SATURATION: 98 % | SYSTOLIC BLOOD PRESSURE: 109 MMHG | TEMPERATURE: 98.6 F | BODY MASS INDEX: 31.2 KG/M2 | HEART RATE: 98 BPM | HEIGHT: 67 IN

## 2021-03-02 DIAGNOSIS — E11.65 TYPE 2 DIABETES MELLITUS WITH HYPERGLYCEMIA, WITH LONG-TERM CURRENT USE OF INSULIN (HCC): Primary | ICD-10-CM

## 2021-03-02 DIAGNOSIS — E11.65 TYPE 2 DIABETES MELLITUS WITH HYPERGLYCEMIA, WITH LONG-TERM CURRENT USE OF INSULIN (HCC): ICD-10-CM

## 2021-03-02 DIAGNOSIS — Z79.4 TYPE 2 DIABETES MELLITUS WITH HYPERGLYCEMIA, WITH LONG-TERM CURRENT USE OF INSULIN (HCC): ICD-10-CM

## 2021-03-02 DIAGNOSIS — Z79.4 TYPE 2 DIABETES MELLITUS WITH HYPERGLYCEMIA, WITH LONG-TERM CURRENT USE OF INSULIN (HCC): Primary | ICD-10-CM

## 2021-03-02 PROBLEM — I10 ESSENTIAL HYPERTENSION, MALIGNANT: Status: RESOLVED | Noted: 2020-07-16 | Resolved: 2021-03-02

## 2021-03-02 PROBLEM — I10 BENIGN ESSENTIAL HTN: Status: ACTIVE | Noted: 2021-03-02

## 2021-03-02 LAB — GLUCOSE POC: 98 MG/DL

## 2021-03-02 PROCEDURE — 2022F DILAT RTA XM EVC RTNOPTHY: CPT | Performed by: INTERNAL MEDICINE

## 2021-03-02 PROCEDURE — G8752 SYS BP LESS 140: HCPCS | Performed by: INTERNAL MEDICINE

## 2021-03-02 PROCEDURE — G8427 DOCREV CUR MEDS BY ELIG CLIN: HCPCS | Performed by: INTERNAL MEDICINE

## 2021-03-02 PROCEDURE — 3017F COLORECTAL CA SCREEN DOC REV: CPT | Performed by: INTERNAL MEDICINE

## 2021-03-02 PROCEDURE — 99214 OFFICE O/P EST MOD 30 MIN: CPT | Performed by: INTERNAL MEDICINE

## 2021-03-02 PROCEDURE — 82962 GLUCOSE BLOOD TEST: CPT | Performed by: INTERNAL MEDICINE

## 2021-03-02 PROCEDURE — 3052F HG A1C>EQUAL 8.0%<EQUAL 9.0%: CPT | Performed by: INTERNAL MEDICINE

## 2021-03-02 PROCEDURE — G8417 CALC BMI ABV UP PARAM F/U: HCPCS | Performed by: INTERNAL MEDICINE

## 2021-03-02 PROCEDURE — 1101F PT FALLS ASSESS-DOCD LE1/YR: CPT | Performed by: INTERNAL MEDICINE

## 2021-03-02 PROCEDURE — 1090F PRES/ABSN URINE INCON ASSESS: CPT | Performed by: INTERNAL MEDICINE

## 2021-03-02 PROCEDURE — G8754 DIAS BP LESS 90: HCPCS | Performed by: INTERNAL MEDICINE

## 2021-03-02 PROCEDURE — G8400 PT W/DXA NO RESULTS DOC: HCPCS | Performed by: INTERNAL MEDICINE

## 2021-03-02 PROCEDURE — G8510 SCR DEP NEG, NO PLAN REQD: HCPCS | Performed by: INTERNAL MEDICINE

## 2021-03-02 PROCEDURE — G8536 NO DOC ELDER MAL SCRN: HCPCS | Performed by: INTERNAL MEDICINE

## 2021-03-02 PROCEDURE — G9899 SCRN MAM PERF RSLTS DOC: HCPCS | Performed by: INTERNAL MEDICINE

## 2021-03-02 RX ORDER — LANCETS 30 GAUGE
EACH MISCELLANEOUS
COMMUNITY
Start: 2021-02-02 | End: 2021-05-18 | Stop reason: SDUPTHER

## 2021-03-02 NOTE — LETTER
3/2/2021 Patient: Yaakov Beck YOB: 1950 Date of Visit: 3/2/2021 Lin Hernandez NP 
[address] [facility] [address] [facility] [address] [address] [employer] Dear Lin Hernandez NP, Thank you for referring Ms. Handy Arvizu to [address] [place] [address] ENDOCRINOLOGY for evaluation. My notes for this consultation are attached. If you have questions, please do not hesitate to call me. I look forward to following your patient along with you. Sincerely, Gabriele Li MD

## 2021-03-02 NOTE — PROGRESS NOTES
History and Physical    Patient: Girtha Siemens MRN: [id number]  SSN: [id number]    YOB: 1950  Age: 72 y.o. Sex: female      Subjective:      Girtha Siemens is a 72 y.o. female with past medical history of hypertension, hyperlipidemia is here for follow-up of type 2 diabetes mellitus. She remains in primary care of RUTHY Sheriff. She continues to be on Levemir 30 units at bedtime, Victoza 1.8 mg daily, metformin 1000 mg twice a day, Jardiance 25 mg daily, Amaryl 1 mg twice a day. She has been taking Amaryl before meals since the last visit after she was educated about it. At the last visit we discussed about cutting down on snacking and following diabetic diet. She was given a log sheet to start checking her sugar and entering in her log 3 times a day. She has been more conscious about what she is eating since she is logging it. She had an episode when her blood glucose was 60s in the morning, when she took her medications but did not eat much for dinner and went to bed. Otherwise no low blood sugars. Glucometer reading: Reviewed patient's glucose logs.   Readings are ranging from 67 to 196 mg/dL    Updated diabetes history:  · Diagnosis: 20 years    · Current treatment: Levemir 30 units at bedtime, Victoza 1.8 mg daily, metformin 1000 mg twice a day, Jardiance 25 mg daily, glimepiride 1 mg twice a day    · Past treatment: Glimepiride    · Glucose checks: fasting daily 200s    · Hyperglycemia: yes    · Hypoglycemia: no    · Meals per day: 3, breakfast: eggs and toast, lunch: salad and sandwich, dinner: meat and veg, starch, bedtime snacks: pie, cereal, snacks: nuts cheese sticks     · Exercise: unable to    · DM related hospitalizations: no        Complications of DM:    · CAD: no    · CVA: no    · PVD: no    · Amputations: no     · Retinopathy: yes definitive diabetic retinopathy right eye, moderate nonproliferative diabetic retinopathy left eye, last exam was November 2020, panretinal photocoagulation was recommended    · Gastropathy: no    · Nephropathy: no    · Neuropathy: yes        Medications:    · Statin: simvastatin 40    · ACE-I: benazepril    · ASA: yes        · Diabetes education: long time back    Past Medical History:   Diagnosis Date    Essential hypertension, malignant     History of mammogram 12/15/2020    Mixed hyperlipidemia     Type II or unspecified type diabetes mellitus with renal manifestations, uncontrolled(250.42) ([facility] [address]      Past Surgical History:   Procedure Laterality Date    HX COLONOSCOPY  08/16/2017    and 6/7/2005    HX TONSILLECTOMY      HX TUBAL LIGATION        Family History   Problem Relation Age of Onset    Heart Disease Mother     Heart Failure Mother     Cancer Father         Lung    Atrial Fibrillation Father     Diabetes Sister     Diabetes Maternal Aunt     Hypertension Paternal Aunt      Social History     Tobacco Use    Smoking status: Never Smoker    Smokeless tobacco: Never Used   Substance Use Topics    Alcohol use: Never     Frequency: Never      Prior to Admission medications    Medication Sig Start Date End Date Taking? Authorizing Provider   QUALCOMM Plus Lancet 30 gauge misc  2/2/21  Yes Provider, Historical   simvastatin (ZOCOR) 40 mg tablet TAKE 1 TABLET BY MOUTH ONCE DAILY IN THE EVENING 2/3/21  Yes Cayden Manzano NP   benazepriL (LOTENSIN) 20 mg tablet Take 1 tablet by mouth once daily 2/3/21  Yes Cayden Manzano NP   metFORMIN (GLUCOPHAGE) 1,000 mg tablet TAKE 1 TABLET BY MOUTH TWICE DAILY WITH MEALS FOR 30 DAYS 2/2/21  Yes Farzana De La Garza MD   empagliflozin (Jardiance) 25 mg tablet Take 1 tablet by mouth once daily for 30 days 2/2/21  Yes Farzana De La Garza MD   insulin detemir U-100 (Levemir FlexTouch U-100 Insuln) 100 unit/mL (3 mL) inpn 30 Units by SubCUTAneous route nightly for 30 days.  2/2/21 3/4/21 Yes Farzana De La Garza MD   liraglutide (Victoza 2-Edilson) 0.6 mg/0.1 mL (18 mg/3 mL) pnij Inject 1.8 mg daily 2/2/21  Yes Roxann Magana MD   pen needle, diabetic (Comfort EZ Pen Needles) 33 gauge x 1/4\" ndle Twice a day 2/2/21  Yes Roxann Magana MD   glucose blood VI test strips (ASCENSIA AUTODISC VI, ONE TOUCH ULTRA TEST VI) strip Use to check glucose 3 times a day 2/2/21  Yes Roxann Magana MD   omega-3 acid ethyl esters (LOVAZA) 1 gram capsule Take 2 g by mouth two (2) times a day. Yes Provider, Historical   cyanocobalamin (Vitamin B-12) 100 mcg tablet Take 100 mcg by mouth daily. Yes Provider, Historical   Cetirizine (ZyrTEC) 10 mg cap Take  by mouth. Yes Provider, Historical        Allergies   Allergen Reactions    Flonase [Fluticasone Propionate] Unknown (comments)    Nasonex [Mometasone] Unknown (comments)       Review of Systems:  ROS    A comprehensive review of systems was preformed and it is negative except mentioned in HPI    Objective:     Vitals:    03/02/21 1031   BP: 109/68   Pulse: 98   Temp: 98.6 °F (37 °C)   TempSrc: Temporal   SpO2: 98%   Weight: 198 lb 12.8 oz (90.2 kg)   Height: 5' 7\" (1.702 m)        Physical Exam:    Physical Exam  Vitals signs and nursing note reviewed. Constitutional:       Appearance: Normal appearance. HENT:      Head: Normocephalic and atraumatic. Eyes:      Extraocular Movements: Extraocular movements intact. Neurological:      General: No focal deficit present. Mental Status: She is alert. Psychiatric:         Mood and Affect: Mood normal.         Behavior: Behavior normal.         Thought Content: Thought content normal.         Judgment: Judgment normal.          Labs and Imaging:  Results for Praneeth Patch (MRN [id number]) as of 3/2/2021 10:24   Ref.  Range 2/23/2021 11:46   Sodium Latest Ref Range: 134 - 144 mmol/L 142   Potassium Latest Ref Range: 3.5 - 5.2 mmol/L 4.4   Chloride Latest Ref Range: 96 - 106 mmol/L 103   CO2 Latest Ref Range: 20 - 29 mmol/L 20   Glucose Latest Ref Range: 65 - 99 mg/dL 225 (H)   BUN Latest Ref Range: 8 - 27 mg/dL 22   Creatinine Latest Ref Range: 0.57 - 1.00 mg/dL 0.79   BUN/Creatinine ratio Latest Ref Range: 12 - 28  28   Calcium Latest Ref Range: 8.7 - 10.3 mg/dL 9.8   GFR est non-AA Latest Ref Range: >59 mL/min/1.73 77   GFR est AA Latest Ref Range: >59 mL/min/1.73 88   Bilirubin, total Latest Ref Range: 0.0 - 1.2 mg/dL 0.7   Protein, total Latest Ref Range: 6.0 - 8.5 g/dL 6.3   Albumin Latest Ref Range: 3.8 - 4.8 g/dL 4.2   A-G Ratio Latest Ref Range: 1.2 - 2.2  2.0   ALT Latest Ref Range: 0 - 32 IU/L 8   AST Latest Ref Range: 0 - 40 IU/L 11   Alk. phosphatase Latest Ref Range: 39 - 117 IU/L 90     Results for Author Christianson (MRN [id number]) as of 3/2/2021 10:24   Ref. Range 2/23/2021 11:46   Triglyceride Latest Ref Range: 0 - 149 mg/dL 154 (H)   Cholesterol, total Latest Ref Range: 100 - 199 mg/dL 142   HDL Cholesterol Latest Ref Range: >39 mg/dL 51   VLDL, calculated Latest Ref Range: 5 - 40 mg/dL 26   LDL, calculated Latest Ref Range: 0 - 99 mg/dL 65   Results for Author Christianson (MRN [id number]) as of 3/2/2021 10:24   Ref. Range 2/23/2021 11:46   Creatinine, urine Latest Ref Range: Not Estab. mg/dL 61.9   Microalbumin, urine Latest Ref Range: Not Estab. ug/mL <3.0   Microalbumin/Creat. Ratio Latest Ref Range: 0 - 29 mg/g creat <5   Results for Author Christianson (MRN [id number]) as of 3/2/2021 10:24   Ref.  Range 2/23/2021 11:46   TSH Latest Ref Range: 0.450 - 4.500 uIU/mL 1.130     Last 3 Recorded Weights in this Encounter    03/02/21 1031   Weight: 198 lb 12.8 oz (90.2 kg)        No results found for: HBA1C, HGBE8, RRL6DVOS, JVC1NCIO, JTH9DKFU     Assessment:     Patient Active Problem List   Diagnosis Code    Mixed hyperlipidemia E78.2    Type II or unspecified type diabetes mellitus with renal manifestations, uncontrolled(250.42) ([facility] E11.29, E11.65    Moderate nonproliferative diabetic retinopathy of both eyes without macular edema associated with type 2 diabetes mellitus ([facility] N12.9443    Cobalamin deficiency E53.8    Type 2 diabetes mellitus with hyperglycemia, with long-term current use of insulin ([facility]CC) E11.65, Z79.4    Benign essential HTN I10           Plan:     type II diabetes mellitus uncontrolled   Hemoglobin A1c was 8.4% on 34297324, 7.7% on 2-, 9.1% on 7-, 8.1% on 2-2-2021  Fingerstick blood glucose is 98 mg/dL in my office today. Up to date with diabetes related annual labs: yes 2-2021  Up to date with diabetic eye exam: yes    plan:  It appears that since patient is logging her glucose readings and her meals, she is doing better job at following diabetic diet. Advised patient to continue with maintaining logs, as her glucose readings look good. Continue Levemir 30 units at bedtime, Jardiance 25 mg daily, Victoza 1.8 mg daily, metformin 1000 mg twice a day, Amaryl 1 mg twice a day before meals. I will see her back in 2 months. mixed hyperlipidemia   LDL 54 in November 2019, 65 in February 2021. continue simvastatin 40 mg daily. essential hypertension   blood pressure is acceptable today. No microalbuminuria February 2021. Diabetic retinopathy:  S/p laser surgery right eye.     Orders Placed This Encounter    AMB POC GLUCOSE BLOOD, BY GLUCOSE MONITORING DEVICE        Signed By: Anthony Tsai MD     March 2, 2021      Return to clinic 2 months

## 2021-05-18 ENCOUNTER — OFFICE VISIT (OUTPATIENT)
Dept: ENDOCRINOLOGY | Age: 71
End: 2021-05-18
Payer: MEDICARE

## 2021-05-18 VITALS
BODY MASS INDEX: 30.86 KG/M2 | SYSTOLIC BLOOD PRESSURE: 136 MMHG | HEART RATE: 87 BPM | TEMPERATURE: 97.1 F | WEIGHT: 196.6 LBS | HEIGHT: 67 IN | OXYGEN SATURATION: 99 % | DIASTOLIC BLOOD PRESSURE: 85 MMHG

## 2021-05-18 DIAGNOSIS — E11.65 TYPE 2 DIABETES MELLITUS WITH HYPERGLYCEMIA, WITH LONG-TERM CURRENT USE OF INSULIN (HCC): Primary | ICD-10-CM

## 2021-05-18 DIAGNOSIS — Z79.4 TYPE 2 DIABETES MELLITUS WITH HYPERGLYCEMIA, WITH LONG-TERM CURRENT USE OF INSULIN (HCC): Primary | ICD-10-CM

## 2021-05-18 PROBLEM — E11.319 DIABETIC RETINOPATHY ASSOCIATED WITH TYPE 2 DIABETES MELLITUS (HCC): Status: ACTIVE | Noted: 2021-05-18

## 2021-05-18 LAB
GLUCOSE POC: 104 MG/DL
HBA1C MFR BLD HPLC: 7.3 %

## 2021-05-18 PROCEDURE — 3052F HG A1C>EQUAL 8.0%<EQUAL 9.0%: CPT | Performed by: INTERNAL MEDICINE

## 2021-05-18 PROCEDURE — 1101F PT FALLS ASSESS-DOCD LE1/YR: CPT | Performed by: INTERNAL MEDICINE

## 2021-05-18 PROCEDURE — 83036 HEMOGLOBIN GLYCOSYLATED A1C: CPT | Performed by: INTERNAL MEDICINE

## 2021-05-18 PROCEDURE — 99214 OFFICE O/P EST MOD 30 MIN: CPT | Performed by: INTERNAL MEDICINE

## 2021-05-18 PROCEDURE — G9899 SCRN MAM PERF RSLTS DOC: HCPCS | Performed by: INTERNAL MEDICINE

## 2021-05-18 PROCEDURE — 1090F PRES/ABSN URINE INCON ASSESS: CPT | Performed by: INTERNAL MEDICINE

## 2021-05-18 PROCEDURE — 2022F DILAT RTA XM EVC RTNOPTHY: CPT | Performed by: INTERNAL MEDICINE

## 2021-05-18 PROCEDURE — 82962 GLUCOSE BLOOD TEST: CPT | Performed by: INTERNAL MEDICINE

## 2021-05-18 PROCEDURE — G8752 SYS BP LESS 140: HCPCS | Performed by: INTERNAL MEDICINE

## 2021-05-18 PROCEDURE — G8417 CALC BMI ABV UP PARAM F/U: HCPCS | Performed by: INTERNAL MEDICINE

## 2021-05-18 PROCEDURE — G8427 DOCREV CUR MEDS BY ELIG CLIN: HCPCS | Performed by: INTERNAL MEDICINE

## 2021-05-18 PROCEDURE — G8510 SCR DEP NEG, NO PLAN REQD: HCPCS | Performed by: INTERNAL MEDICINE

## 2021-05-18 PROCEDURE — G8754 DIAS BP LESS 90: HCPCS | Performed by: INTERNAL MEDICINE

## 2021-05-18 PROCEDURE — G8536 NO DOC ELDER MAL SCRN: HCPCS | Performed by: INTERNAL MEDICINE

## 2021-05-18 PROCEDURE — G8400 PT W/DXA NO RESULTS DOC: HCPCS | Performed by: INTERNAL MEDICINE

## 2021-05-18 PROCEDURE — 3017F COLORECTAL CA SCREEN DOC REV: CPT | Performed by: INTERNAL MEDICINE

## 2021-05-18 RX ORDER — LANCETS 30 GAUGE
EACH MISCELLANEOUS
Qty: 300 LANCET | Refills: 1 | Status: SHIPPED | OUTPATIENT
Start: 2021-05-18 | End: 2021-09-22 | Stop reason: SDUPTHER

## 2021-05-18 RX ORDER — GLIMEPIRIDE 1 MG/1
TABLET ORAL
COMMUNITY
Start: 2021-05-03 | End: 2021-05-18 | Stop reason: SDUPTHER

## 2021-05-18 RX ORDER — GLIMEPIRIDE 1 MG/1
1 TABLET ORAL 2 TIMES DAILY
Qty: 180 TAB | Refills: 1 | Status: SHIPPED | OUTPATIENT
Start: 2021-05-18 | End: 2021-08-16

## 2021-05-18 RX ORDER — LIRAGLUTIDE 6 MG/ML
INJECTION SUBCUTANEOUS
Qty: 9 ADJUSTABLE DOSE PRE-FILLED PEN SYRINGE | Refills: 1 | Status: SHIPPED | OUTPATIENT
Start: 2021-05-18 | End: 2021-07-30

## 2021-05-18 RX ORDER — TRAMADOL HYDROCHLORIDE 50 MG/1
TABLET ORAL
COMMUNITY
Start: 2021-04-27 | End: 2021-07-20

## 2021-05-18 RX ORDER — ALCOHOL ANTISEPTIC PADS
PADS, MEDICATED (EA) TOPICAL
Qty: 200 EACH | Refills: 1 | Status: SHIPPED | OUTPATIENT
Start: 2021-05-18 | End: 2021-09-22 | Stop reason: SDUPTHER

## 2021-05-18 RX ORDER — INSULIN DETEMIR 100 [IU]/ML
INJECTION, SOLUTION SUBCUTANEOUS
COMMUNITY
Start: 2021-04-12 | End: 2021-05-18 | Stop reason: SDUPTHER

## 2021-05-18 RX ORDER — MELOXICAM 15 MG/1
TABLET ORAL
COMMUNITY
Start: 2021-04-27 | End: 2021-07-20

## 2021-05-18 RX ORDER — INSULIN DETEMIR 100 [IU]/ML
INJECTION, SOLUTION SUBCUTANEOUS
Qty: 9 ADJUSTABLE DOSE PRE-FILLED PEN SYRINGE | Refills: 1 | Status: SHIPPED | OUTPATIENT
Start: 2021-05-18 | End: 2021-09-22 | Stop reason: SDUPTHER

## 2021-05-18 RX ORDER — METFORMIN HYDROCHLORIDE 1000 MG/1
TABLET ORAL
Qty: 180 TAB | Refills: 1 | Status: SHIPPED | OUTPATIENT
Start: 2021-05-18 | End: 2021-09-22 | Stop reason: SDUPTHER

## 2021-05-18 NOTE — PROGRESS NOTES
History and Physical    Patient: Kenyetta Dotson MRN: [id number]  SSN: [id number]    YOB: 1950  Age: 80 y.o. Sex: female      Subjective:      Kenyetta Dotson is a 80 y.o. female with past medical history of hypertension, hyperlipidemia is here for follow-up of type 2 diabetes mellitus. She remains in primary care of RUTHY Severino. She continues to be on Levemir 30 units at bedtime, Victoza 1.8 mg daily, metformin 1000 mg twice a day, Jardiance 25 mg daily, Amaryl 1 mg twice a day. She has been taking Amaryl before meals since the last visit after she was educated about it. At the last visit we discussed about cutting down on snacking and following diabetic diet. She was given a log sheet to start checking her sugar and entering in her log 3 times a day. She has been more conscious about what she is eating since she is logging it. She had an episode when her blood glucose was 60s in the morning, this typically happens when she does not eat a bedtime snack. Denies any daytime hypoglycemia. Continues to check blood glucose 3 times a day. Regarding diabetic retinopathy: Last diabetic eye exam was in April 2021 and she did not need any more laser treatment. Next appointment is in October 2021. Since the last visit patient had Achilles tendinitis which resolved when she was given a boot, right shoulder frozen shoulder for which she required an injection which made her blood glucose high for a few days and now she is doing physical therapy. Glucometer reading: Reviewed patient's glucose logs.   Readings are ranging from 68 to 180 mg/dL    Updated diabetes history:  · Diagnosis: 20 years    · Current treatment: Levemir 30 units at bedtime, Victoza 1.8 mg daily, metformin 1000 mg twice a day, Jardiance 25 mg daily, glimepiride 1 mg twice a day    · Past treatment: Glimepiride    · Glucose checks: fasting daily 200s    · Hyperglycemia: yes    · Hypoglycemia: no    · Meals per day: 3, breakfast: eggs and toast, lunch: salad and sandwich, dinner: meat and veg, starch, bedtime snacks: pie, cereal, snacks: nuts cheese sticks     · Exercise: unable to    · DM related hospitalizations: no        Complications of DM:    · CAD: no    · CVA: no    · PVD: no    · Amputations: no     · Retinopathy: yes definitive diabetic retinopathy right eye, moderate nonproliferative diabetic retinopathy left eye, last exam was November 2020, panretinal photocoagulation was recommended    · Gastropathy: no    · Nephropathy: no    · Neuropathy: yes        Medications:    · Statin: simvastatin 40    · ACE-I: benazepril    · ASA: yes        · Diabetes education: long time back    Past Medical History:   Diagnosis Date    Essential hypertension, malignant     History of mammogram 12/15/2020    Mixed hyperlipidemia     Type II or unspecified type diabetes mellitus with renal manifestations, uncontrolled(250.42) ([facility] [address]      Past Surgical History:   Procedure Laterality Date    HX COLONOSCOPY  08/16/2017    and 6/7/2005    HX TONSILLECTOMY      HX TUBAL LIGATION        Family History   Problem Relation Age of Onset    Heart Disease Mother     Heart Failure Mother     Cancer Father         Lung    Atrial Fibrillation Father     Diabetes Sister     Diabetes Maternal Aunt     Hypertension Paternal Aunt      Social History     Tobacco Use    Smoking status: Never Smoker    Smokeless tobacco: Never Used   Substance Use Topics    Alcohol use: Never     Frequency: Never      Prior to Admission medications    Medication Sig Start Date End Date Taking?  Authorizing Provider   meloxicam (MOBIC) 15 mg tablet TAKE 1 TABLET BY MOUTH ONCE DAILY 4/27/21  Yes Provider, Historical   traMADoL (ULTRAM) 50 mg tablet TAKE 1 TABLET BY MOUTH EVERY 6 HOURS AS NEEDED FOR MODERATE TO SEVERE FOR PAIN 4/27/21  Yes Provider, Historical   Levemir FlexTouch U-100 Insuln 100 unit/mL (3 mL) inpn Inject 25 units at bedtime, 90 days 5/18/21 Yes Bradley Darling MD   glimepiride (AMARYL) 1 mg tablet Take 1 Tab by mouth two (2) times a day for 90 days. 5/18/21 8/16/21 Yes Bradley Darling MD   empagliflozin (Jardiance) 25 mg tablet Once a day, 90 days 5/18/21  Yes Bradley Darling MD   metFORMIN (GLUCOPHAGE) 1,000 mg tablet TAKE 1 TABLET BY MOUTH TWICE DAILY WITH MEALS, 90 days 5/18/21  Yes Bradley Darling MD   liraglutide (Victoza 2-Edilson) 0.6 mg/0.1 mL (18 mg/3 mL) pnij Inject 1.8 mg daily 5/18/21  Yes Bradley Darling MD   pen needle, diabetic (Comfort EZ Pen Needles) 33 gauge x 1/4\" ndle Twice a day, 90 days 5/18/21  Yes Bradley Darling MD   OneTouch Delica Plus Lancet 30 gauge misc 3 times a day, 90 days 5/18/21  Yes Bradley Darling MD   glucose blood VI test strips (ASCENSIA AUTODISC VI, ONE TOUCH ULTRA TEST VI) strip Use to check glucose 3 times a day 5/18/21  Yes Bradley Darling MD   simvastatin (ZOCOR) 40 mg tablet TAKE 1 TABLET BY MOUTH ONCE DAILY IN THE EVENING 2/3/21  Yes Kyler Núñez NP   benazepriL (LOTENSIN) 20 mg tablet Take 1 tablet by mouth once daily 2/3/21  Yes Kyler Núñez NP   omega-3 acid ethyl esters (LOVAZA) 1 gram capsule Take 2 g by mouth two (2) times a day. Yes Provider, Historical   cyanocobalamin (Vitamin B-12) 100 mcg tablet Take 100 mcg by mouth daily. Yes Provider, Historical   Cetirizine (ZyrTEC) 10 mg cap Take  by mouth. Yes Provider, Historical        Allergies   Allergen Reactions    Flonase [Fluticasone Propionate] Unknown (comments)    Nasonex [Mometasone] Unknown (comments)       Review of Systems:  ROS    A comprehensive review of systems was preformed and it is negative except mentioned in HPI    Objective:     Vitals:    05/18/21 0903   BP: 136/85   Pulse: 87   Temp: 97.1 °F (36.2 °C)   TempSrc: Temporal   SpO2: 99%   Weight: 196 lb 9.6 oz (89.2 kg)   Height: 5' 7\" (1.702 m)        Physical Exam:    Physical Exam  Vitals signs and nursing note reviewed. Constitutional:       Appearance: Normal appearance. HENT:      Head: Normocephalic and atraumatic. Eyes:      Extraocular Movements: Extraocular movements intact. Neurological:      General: No focal deficit present. Mental Status: She is alert. Psychiatric:         Mood and Affect: Mood normal.         Behavior: Behavior normal.         Thought Content: Thought content normal.         Judgment: Judgment normal.          Labs and Imaging:  Results for Kip Victor (MRN [id number]) as of 3/2/2021 10:24   Ref. Range 2/23/2021 11:46   Sodium Latest Ref Range: 134 - 144 mmol/L 142   Potassium Latest Ref Range: 3.5 - 5.2 mmol/L 4.4   Chloride Latest Ref Range: 96 - 106 mmol/L 103   CO2 Latest Ref Range: 20 - 29 mmol/L 20   Glucose Latest Ref Range: 65 - 99 mg/dL 225 (H)   BUN Latest Ref Range: 8 - 27 mg/dL 22   Creatinine Latest Ref Range: 0.57 - 1.00 mg/dL 0.79   BUN/Creatinine ratio Latest Ref Range: 12 - 28  28   Calcium Latest Ref Range: 8.7 - 10.3 mg/dL 9.8   GFR est non-AA Latest Ref Range: >59 mL/min/1.73 77   GFR est AA Latest Ref Range: >59 mL/min/1.73 88   Bilirubin, total Latest Ref Range: 0.0 - 1.2 mg/dL 0.7   Protein, total Latest Ref Range: 6.0 - 8.5 g/dL 6.3   Albumin Latest Ref Range: 3.8 - 4.8 g/dL 4.2   A-G Ratio Latest Ref Range: 1.2 - 2.2  2.0   ALT Latest Ref Range: 0 - 32 IU/L 8   AST Latest Ref Range: 0 - 40 IU/L 11   Alk. phosphatase Latest Ref Range: 39 - 117 IU/L 90     Results for Kip Victor (MRN [id number]) as of 3/2/2021 10:24   Ref. Range 2/23/2021 11:46   Triglyceride Latest Ref Range: 0 - 149 mg/dL 154 (H)   Cholesterol, total Latest Ref Range: 100 - 199 mg/dL 142   HDL Cholesterol Latest Ref Range: >39 mg/dL 51   VLDL, calculated Latest Ref Range: 5 - 40 mg/dL 26   LDL, calculated Latest Ref Range: 0 - 99 mg/dL 65   Results for Kip Victor (MRN [id number]) as of 3/2/2021 10:24   Ref. Range 2/23/2021 11:46   Creatinine, urine Latest Ref Range: Not Estab. mg/dL 61.9   Microalbumin, urine Latest Ref Range: Not Estab. ug/mL <3.0   Microalbumin/Creat. Ratio Latest Ref Range: 0 - 29 mg/g creat <5   Results for Santosh Jalloh (MRN [id number]) as of 3/2/2021 10:24   Ref. Range 2/23/2021 11:46   TSH Latest Ref Range: 0.450 - 4.500 uIU/mL 1.130     Last 3 Recorded Weights in this Encounter    05/18/21 0903   Weight: 196 lb 9.6 oz (89.2 kg)        No results found for: HBA1C, HGBE8, NHZ4DVAZ, SPE9XPZH, UPJ2KVIB     Assessment:     Patient Active Problem List   Diagnosis Code    Mixed hyperlipidemia E78.2    Type II or unspecified type diabetes mellitus with renal manifestations, uncontrolled(250.42) ([facility] [address] E11.29, E11.65    Moderate nonproliferative diabetic retinopathy of both eyes without macular edema associated with type 2 diabetes mellitus ([facility] [address] L74.3447    Cobalamin deficiency E53.8    Type 2 diabetes mellitus with hyperglycemia, with long-term current use of insulin ([facility]) E11.65, Z79.4    Benign essential HTN I10    Diabetic retinopathy associated with type 2 diabetes mellitus ([facility] [address] E11.319           Plan:     type II diabetes mellitus uncontrolled   Hemoglobin A1c was 8.4% on 01189563, 7.7% on 2-, 9.1% on 7-, 8.1% on 2-2-2021, 7.3% today. Fingerstick blood glucose is 104 mg/dL in my office today. Up to date with diabetes related annual labs: yes 2-2021  Up to date with diabetic eye exam: yes    plan:  It appears that since patient is logging her glucose readings and her meals, she is doing better job at following diabetic diet. Advised patient to continue with maintaining logs, as her glucose readings look good. Since fasting readings are running low I will decrease Levemir to 25 units at bedtime. Continue Jardiance 25 mg daily, Victoza 1.8 mg daily, metformin 1000 mg twice a day, Amaryl 1 mg twice a day before meals. I will see her back in 3 months. mixed hyperlipidemia   LDL 54 in November 2019, 65 in February 2021. continue simvastatin 40 mg daily.      essential hypertension   blood pressure is acceptable today. No microalbuminuria February 2021. Diabetic retinopathy:  S/p laser surgery right eye. Last exam was in 4-2021 and she did not require any more laser surgery. Next exam in . Orders Placed This Encounter    AMB POC HEMOGLOBIN A1C    AMB POC GLUCOSE BLOOD, BY GLUCOSE MONITORING DEVICE    Levemir FlexTouch U-100 Insuln 100 unit/mL (3 mL) inpn     Sig: Inject 25 units at bedtime, 90 days     Dispense:  9 Adjustable Dose Pre-filled Pen Syringe     Refill:  1    glimepiride (AMARYL) 1 mg tablet     Sig: Take 1 Tab by mouth two (2) times a day for 90 days.      Dispense:  180 Tab     Refill:  1    empagliflozin (Jardiance) 25 mg tablet     Sig: Once a day, 90 days     Dispense:  90 Tab     Refill:  1    metFORMIN (GLUCOPHAGE) 1,000 mg tablet     Sig: TAKE 1 TABLET BY MOUTH TWICE DAILY WITH MEALS, 90 days     Dispense:  180 Tab     Refill:  1    liraglutide (Victoza 2-Edilson) 0.6 mg/0.1 mL (18 mg/3 mL) pnij     Sig: Inject 1.8 mg daily     Dispense:  9 Adjustable Dose Pre-filled Pen Syringe     Refill:  1    pen needle, diabetic (Comfort EZ Pen Needles) 33 gauge x 1/4\" ndle     Sig: Twice a day, 90 days     Dispense:  200 Each     Refill:  1    OneTouch Delica Plus Lancet 30 gauge misc     Sig: 3 times a day, 90 days     Dispense:  300 Lancet     Refill:  1    glucose blood VI test strips (ASCENSIA AUTODISC VI, ONE TOUCH ULTRA TEST VI) strip     Sig: Use to check glucose 3 times a day     Dispense:  300 Strip     Refill:  1        Signed By: Prasanna Samaniego MD     May 18, 2021      Return to clinic 3 months

## 2021-05-18 NOTE — LETTER
5/18/2021 Patient: Antonella Marie YOB: 1950 Date of Visit: 5/18/2021 Stephanie Gamez NP 
[address] [place] [address] [phone] [address] [address] [employer] Dear Stephanie Gamez NP, Thank you for referring Ms. Riri Abad to [address] [place] [address] ENDOCRINOLOGY for evaluation. My notes for this consultation are attached. If you have questions, please do not hesitate to call me. I look forward to following your patient along with you. Sincerely, Kenyon Jc MD

## 2021-07-03 ENCOUNTER — TRANSCRIBE ORDER (OUTPATIENT)
Dept: SCHEDULING | Age: 71
End: 2021-07-03

## 2021-07-03 DIAGNOSIS — M77.51 ADHESIVE CAPSULITIS OF ANKLE, RIGHT: ICD-10-CM

## 2021-07-03 DIAGNOSIS — M75.111 NONTRAUMATIC INCOMPLETE RUPTURE OF RIGHT ROTATOR CUFF: Primary | ICD-10-CM

## 2021-07-20 ENCOUNTER — OFFICE VISIT (OUTPATIENT)
Dept: FAMILY MEDICINE CLINIC | Age: 71
End: 2021-07-20
Payer: MEDICARE

## 2021-07-20 ENCOUNTER — HOSPITAL ENCOUNTER (OUTPATIENT)
Dept: MRI IMAGING | Age: 71
Discharge: HOME | End: 2021-07-20
Attending: FAMILY MEDICINE
Payer: MEDICARE

## 2021-07-20 VITALS
HEART RATE: 97 BPM | OXYGEN SATURATION: 97 % | BODY MASS INDEX: 30.98 KG/M2 | RESPIRATION RATE: 20 BRPM | DIASTOLIC BLOOD PRESSURE: 78 MMHG | SYSTOLIC BLOOD PRESSURE: 120 MMHG | TEMPERATURE: 96.9 F | HEIGHT: 67 IN | WEIGHT: 197.4 LBS

## 2021-07-20 DIAGNOSIS — E66.09 CLASS 1 OBESITY DUE TO EXCESS CALORIES WITH SERIOUS COMORBIDITY AND BODY MASS INDEX (BMI) OF 30.0 TO 30.9 IN ADULT: ICD-10-CM

## 2021-07-20 DIAGNOSIS — M77.51 ADHESIVE CAPSULITIS OF ANKLE, RIGHT: ICD-10-CM

## 2021-07-20 DIAGNOSIS — R00.0 TACHYCARDIA: ICD-10-CM

## 2021-07-20 DIAGNOSIS — Z78.0 ASYMPTOMATIC POSTMENOPAUSAL STATE: ICD-10-CM

## 2021-07-20 DIAGNOSIS — Z12.31 ENCOUNTER FOR SCREENING MAMMOGRAM FOR MALIGNANT NEOPLASM OF BREAST: ICD-10-CM

## 2021-07-20 DIAGNOSIS — M75.111 NONTRAUMATIC INCOMPLETE RUPTURE OF RIGHT ROTATOR CUFF: ICD-10-CM

## 2021-07-20 DIAGNOSIS — I10 BENIGN ESSENTIAL HTN: Primary | ICD-10-CM

## 2021-07-20 PROCEDURE — G8752 SYS BP LESS 140: HCPCS | Performed by: NURSE PRACTITIONER

## 2021-07-20 PROCEDURE — 99214 OFFICE O/P EST MOD 30 MIN: CPT | Performed by: NURSE PRACTITIONER

## 2021-07-20 PROCEDURE — G8417 CALC BMI ABV UP PARAM F/U: HCPCS | Performed by: NURSE PRACTITIONER

## 2021-07-20 PROCEDURE — 1090F PRES/ABSN URINE INCON ASSESS: CPT | Performed by: NURSE PRACTITIONER

## 2021-07-20 PROCEDURE — 73221 MRI JOINT UPR EXTREM W/O DYE: CPT

## 2021-07-20 PROCEDURE — G9899 SCRN MAM PERF RSLTS DOC: HCPCS | Performed by: NURSE PRACTITIONER

## 2021-07-20 PROCEDURE — G8400 PT W/DXA NO RESULTS DOC: HCPCS | Performed by: NURSE PRACTITIONER

## 2021-07-20 PROCEDURE — G8754 DIAS BP LESS 90: HCPCS | Performed by: NURSE PRACTITIONER

## 2021-07-20 PROCEDURE — G8432 DEP SCR NOT DOC, RNG: HCPCS | Performed by: NURSE PRACTITIONER

## 2021-07-20 PROCEDURE — G8427 DOCREV CUR MEDS BY ELIG CLIN: HCPCS | Performed by: NURSE PRACTITIONER

## 2021-07-20 PROCEDURE — G8536 NO DOC ELDER MAL SCRN: HCPCS | Performed by: NURSE PRACTITIONER

## 2021-07-20 PROCEDURE — 3017F COLORECTAL CA SCREEN DOC REV: CPT | Performed by: NURSE PRACTITIONER

## 2021-07-20 PROCEDURE — 1101F PT FALLS ASSESS-DOCD LE1/YR: CPT | Performed by: NURSE PRACTITIONER

## 2021-07-20 NOTE — PROGRESS NOTES
Chief Complaint   Patient presents with    Follow Up Chronic Condition     diabetes, htn     1. Have you been to the ER, urgent care clinic since your last visit? Hospitalized since your last visit? No    2. Have you seen or consulted any other health care providers outside of the [address] [place] [address] since your last visit? Include any pap smears or colon screening.  Yes Where: [facility] Reason for visit: Physical therapy frozen shoulder    Visit Vitals  /78 (BP 1 Location: Right arm, BP Patient Position: Sitting, BP Cuff Size: Adult)   Pulse (!) 111   Temp 96.9 °F (36.1 °C) (Temporal)   Resp 20   Ht 5' 7\" (1.702 m)   Wt 197 lb 6.4 oz (89.5 kg)   SpO2 97%   BMI 30.92 kg/m²

## 2021-07-20 NOTE — PROGRESS NOTES
Subjective  Chief Complaint   Patient presents with    Follow Up Chronic Condition     diabetes, htn     HPI:  Rosita Tovar is a 80 y.o. female. Patient presents for management of hypertension. Follows with endocrinology for management of diabetes. Management of HTN-  Current meds: Benazepril 20mg daily   Home BP readings: 110-120/80s  High salt intake: at times  Stays hydrated: yes  Regular exercise: no  Denies lightheadedness, dizziness, headaches, chest pain, palpitations, lower extremity edema, and calf pain with exertion. Past Medical History:   Diagnosis Date    Essential hypertension, malignant     Frozen shoulder     History of mammogram 12/15/2020    Mixed hyperlipidemia     Type II or unspecified type diabetes mellitus with renal manifestations, uncontrolled(250.42) ([facility] [address]      Family History   Problem Relation Age of Onset    Heart Disease Mother     Heart Failure Mother     Cancer Father         Lung    Atrial Fibrillation Father     Diabetes Sister     Diabetes Maternal Aunt     Hypertension Paternal Aunt      Social History     Socioeconomic History    Marital status:      Spouse name: Not on file    Number of children: Not on file    Years of education: Not on file    Highest education level: Not on file   Occupational History    Not on file   Tobacco Use    Smoking status: Never Smoker    Smokeless tobacco: Never Used   Vaping Use    Vaping Use: Never used   Substance and Sexual Activity    Alcohol use: Never    Drug use: Never    Sexual activity: Not on file   Other Topics Concern    Not on file   Social History Narrative    Not on file     Social Determinants of Health     Financial Resource Strain:     Difficulty of Paying Living Expenses:    Food Insecurity:     Worried About [address] in the Last Year:     [address] in the Last Year:    Transportation Needs:     Lack of Transportation (Medical):      Lack of Transportation (Non-Medical):    Physical Activity:     Days of Exercise per Week:     Minutes of Exercise per Session:    Stress:     Feeling of Stress :    Social Connections:     Frequency of Communication with Friends and Family:     Frequency of Social Gatherings with Friends and Family:     Attends Scientologist Services:     Active Member of Clubs or Organizations:     Attends Club or Organization Meetings:     Marital Status:    Intimate Partner Violence:     Fear of Current or Ex-Partner:     Emotionally Abused:     Physically Abused:     Sexually Abused:      Current Outpatient Medications on File Prior to Visit   Medication Sig Dispense Refill    Levemir FlexTouch U-100 Insuln 100 unit/mL (3 mL) inpn Inject 25 units at bedtime, 90 days 9 Adjustable Dose Pre-filled Pen Syringe 1    glimepiride (AMARYL) 1 mg tablet Take 1 Tab by mouth two (2) times a day for 90 days. 180 Tab 1    empagliflozin (Jardiance) 25 mg tablet Once a day, 90 days 90 Tab 1    metFORMIN (GLUCOPHAGE) 1,000 mg tablet TAKE 1 TABLET BY MOUTH TWICE DAILY WITH MEALS, 90 days 180 Tab 1    liraglutide (Victoza 2-Edilson) 0.6 mg/0.1 mL (18 mg/3 mL) pnij Inject 1.8 mg daily 9 Adjustable Dose Pre-filled Pen Syringe 1    pen needle, diabetic (Comfort EZ Pen Needles) 33 gauge x 1/4\" ndle Twice a day, 90 days 200 Each 1    OneTouch Delica Plus Lancet 30 gauge misc 3 times a day, 90 days 300 Lancet 1    glucose blood VI test strips (ASCENSIA AUTODISC VI, ONE TOUCH ULTRA TEST VI) strip Use to check glucose 3 times a day 300 Strip 1    simvastatin (ZOCOR) 40 mg tablet TAKE 1 TABLET BY MOUTH ONCE DAILY IN THE EVENING 90 Tab 2    benazepriL (LOTENSIN) 20 mg tablet Take 1 tablet by mouth once daily 90 Tab 1    omega-3 acid ethyl esters (LOVAZA) 1 gram capsule Take 2 g by mouth two (2) times a day.  cyanocobalamin (Vitamin B-12) 100 mcg tablet Take 100 mcg by mouth daily.  Cetirizine (ZyrTEC) 10 mg cap Take  by mouth.       [DISCONTINUED] meloxicam (MOBIC) 15 mg tablet TAKE 1 TABLET BY MOUTH ONCE DAILY (Patient not taking: Reported on 7/20/2021)      [DISCONTINUED] traMADoL (ULTRAM) 50 mg tablet TAKE 1 TABLET BY MOUTH EVERY 6 HOURS AS NEEDED FOR MODERATE TO SEVERE FOR PAIN (Patient not taking: Reported on 7/20/2021)       No current facility-administered medications on file prior to visit. Allergies   Allergen Reactions    Flonase [Fluticasone Propionate] Unknown (comments)    Nasonex [Mometasone] Unknown (comments)     ROS  See HPI for pertinent ROS. Objective  Visit Vitals  /78 (BP 1 Location: Right arm, BP Patient Position: Sitting, BP Cuff Size: Adult)   Pulse 97   Temp 96.9 °F (36.1 °C) (Temporal)   Resp 20   Ht 5' 7\" (1.702 m)   Wt 197 lb 6.4 oz (89.5 kg)   SpO2 97%   BMI 30.92 kg/m²       Physical Exam  Vitals and nursing note reviewed. Constitutional:       General: She is not in acute distress. Appearance: Normal appearance. She is obese. HENT:      Head: Normocephalic. Eyes:      Extraocular Movements: Extraocular movements intact. Cardiovascular:      Rate and Rhythm: Normal rate and regular rhythm. Heart sounds: Normal heart sounds. Pulmonary:      Effort: Pulmonary effort is normal.      Breath sounds: Normal breath sounds. Musculoskeletal:         General: Normal range of motion. Right lower leg: No edema. Left lower leg: No edema. Skin:     General: Skin is warm and dry. Neurological:      Mental Status: She is alert and oriented to person, place, and time. Psychiatric:         Mood and Affect: Mood normal.         Behavior: Behavior normal.          Assessment & Plan      ICD-10-CM ICD-9-CM    1. Benign essential HTN  I10 401.1    2. Tachycardia  R00.0 785.0    3. Class 1 obesity due to excess calories with serious comorbidity and body mass index (BMI) of 30.0 to 30.9 in adult  E66.09 278.00     Z68.30 V85.30    4.  Encounter for screening mammogram for malignant neoplasm of breast Z12.31 V76.12 [facility] MAMMO BI SCREENING INCL CAD   5. Asymptomatic postmenopausal state  Z78.0 V49.81 DEXA BONE DENSITY STUDY AXIAL     Diagnoses and all orders for this visit:    1. Benign essential HTN  BP is below goal in the office today and on reported home readings. Continue current medication. Continue to check BP readings regularly and call if consistently greater than 140/90 on either number. 2. Tachycardia  Heart rate above goal on intake and below goal on recheck. 3. Class 1 obesity due to excess calories with serious comorbidity and body mass index (BMI) of 30.0 to 30.9 in adult  Weight remains stable. Discussed the positive impact regular exercise and weight loss will have for chronic conditions and overall health. 4. Encounter for screening mammogram for malignant neoplasm of breast  -     [facility] MAMMO BI SCREENING INCL CAD; Future    5. Asymptomatic postmenopausal state  -     DEXA BONE DENSITY STUDY AXIAL; Future      Follow-up and Dispositions    · Return in about 6 months (around 1/20/2022) for MCW, fasting labs, follow up, chronic conditions/meds.            Clarisa Kennedy NP

## 2021-09-22 ENCOUNTER — OFFICE VISIT (OUTPATIENT)
Dept: ENDOCRINOLOGY | Age: 71
End: 2021-09-22
Payer: MEDICARE

## 2021-09-22 VITALS
SYSTOLIC BLOOD PRESSURE: 101 MMHG | TEMPERATURE: 97.1 F | BODY MASS INDEX: 30.17 KG/M2 | DIASTOLIC BLOOD PRESSURE: 62 MMHG | HEIGHT: 67 IN | OXYGEN SATURATION: 97 % | HEART RATE: 102 BPM | WEIGHT: 192.2 LBS

## 2021-09-22 DIAGNOSIS — E11.65 TYPE 2 DIABETES MELLITUS WITH HYPERGLYCEMIA, WITH LONG-TERM CURRENT USE OF INSULIN (HCC): Primary | ICD-10-CM

## 2021-09-22 DIAGNOSIS — Z79.4 TYPE 2 DIABETES MELLITUS WITH HYPERGLYCEMIA, WITH LONG-TERM CURRENT USE OF INSULIN (HCC): Primary | ICD-10-CM

## 2021-09-22 LAB
GLUCOSE POC: 126 MG/DL
HBA1C MFR BLD HPLC: 7.7 %

## 2021-09-22 PROCEDURE — G8417 CALC BMI ABV UP PARAM F/U: HCPCS | Performed by: INTERNAL MEDICINE

## 2021-09-22 PROCEDURE — 2022F DILAT RTA XM EVC RTNOPTHY: CPT | Performed by: INTERNAL MEDICINE

## 2021-09-22 PROCEDURE — G8752 SYS BP LESS 140: HCPCS | Performed by: INTERNAL MEDICINE

## 2021-09-22 PROCEDURE — 1090F PRES/ABSN URINE INCON ASSESS: CPT | Performed by: INTERNAL MEDICINE

## 2021-09-22 PROCEDURE — 83036 HEMOGLOBIN GLYCOSYLATED A1C: CPT | Performed by: INTERNAL MEDICINE

## 2021-09-22 PROCEDURE — G8754 DIAS BP LESS 90: HCPCS | Performed by: INTERNAL MEDICINE

## 2021-09-22 PROCEDURE — 82962 GLUCOSE BLOOD TEST: CPT | Performed by: INTERNAL MEDICINE

## 2021-09-22 PROCEDURE — G8400 PT W/DXA NO RESULTS DOC: HCPCS | Performed by: INTERNAL MEDICINE

## 2021-09-22 PROCEDURE — G8536 NO DOC ELDER MAL SCRN: HCPCS | Performed by: INTERNAL MEDICINE

## 2021-09-22 PROCEDURE — G9899 SCRN MAM PERF RSLTS DOC: HCPCS | Performed by: INTERNAL MEDICINE

## 2021-09-22 PROCEDURE — 99214 OFFICE O/P EST MOD 30 MIN: CPT | Performed by: INTERNAL MEDICINE

## 2021-09-22 PROCEDURE — G8427 DOCREV CUR MEDS BY ELIG CLIN: HCPCS | Performed by: INTERNAL MEDICINE

## 2021-09-22 PROCEDURE — 3017F COLORECTAL CA SCREEN DOC REV: CPT | Performed by: INTERNAL MEDICINE

## 2021-09-22 PROCEDURE — 1101F PT FALLS ASSESS-DOCD LE1/YR: CPT | Performed by: INTERNAL MEDICINE

## 2021-09-22 PROCEDURE — 3051F HG A1C>EQUAL 7.0%<8.0%: CPT | Performed by: INTERNAL MEDICINE

## 2021-09-22 PROCEDURE — G8510 SCR DEP NEG, NO PLAN REQD: HCPCS | Performed by: INTERNAL MEDICINE

## 2021-09-22 RX ORDER — INSULIN DETEMIR 100 [IU]/ML
INJECTION, SOLUTION SUBCUTANEOUS
Qty: 9 ADJUSTABLE DOSE PRE-FILLED PEN SYRINGE | Refills: 1 | Status: SHIPPED | OUTPATIENT
Start: 2021-09-22 | End: 2021-12-22 | Stop reason: SDUPTHER

## 2021-09-22 RX ORDER — METFORMIN HYDROCHLORIDE 1000 MG/1
TABLET ORAL
Qty: 180 TABLET | Refills: 1 | Status: SHIPPED | OUTPATIENT
Start: 2021-09-22 | End: 2021-12-22 | Stop reason: SDUPTHER

## 2021-09-22 RX ORDER — ALCOHOL ANTISEPTIC PADS
PADS, MEDICATED (EA) TOPICAL
Qty: 200 EACH | Refills: 1 | Status: SHIPPED | OUTPATIENT
Start: 2021-09-22

## 2021-09-22 RX ORDER — GLIMEPIRIDE 1 MG/1
1 TABLET ORAL 2 TIMES DAILY
Qty: 180 TABLET | Refills: 1 | Status: SHIPPED | OUTPATIENT
Start: 2021-09-22 | End: 2021-12-15

## 2021-09-22 RX ORDER — GLIMEPIRIDE 1 MG/1
TABLET ORAL
COMMUNITY
Start: 2021-09-02 | End: 2021-09-22 | Stop reason: SDUPTHER

## 2021-09-22 RX ORDER — LANCETS 30 GAUGE
EACH MISCELLANEOUS
Qty: 300 LANCET | Refills: 1 | Status: SHIPPED | OUTPATIENT
Start: 2021-09-22 | End: 2021-12-22 | Stop reason: SDUPTHER

## 2021-09-22 RX ORDER — SEMAGLUTIDE 1.34 MG/ML
0.5 INJECTION, SOLUTION SUBCUTANEOUS
Qty: 3 BOX | Refills: 1 | Status: SHIPPED | OUTPATIENT
Start: 2021-09-22 | End: 2021-12-21

## 2021-09-22 NOTE — PROGRESS NOTES
History and Physical    Patient: Edgardo To MRN: [id number]  SSN: [id number]    YOB: 1950  Age: 80 y.o. Sex: female      Subjective:      Edgardo To is a 80 y.o. female with past medical history of hypertension, hyperlipidemia is here for follow-up of type 2 diabetes mellitus. She remains in primary care of RUTHY Vazquez. At the last visit Levemir was decreased from 30 units to 25 units at bedtime because she was having some fasting hypoglycemia, we continued Victoza 1.8 mg daily, metformin 1000 mg twice a day, Jardiance 25 mg daily, Amaryl 1 mg twice a day. Denies any daytime hypoglycemia. Sometimes she has fasting readings in 70s and 80s, if she eats a light dinner and does not eat a bedtime snack. She tells me Victoza does not control her appetite. She has trouble with eating right, she accepts that. She has lost 4 pounds since last visit. Denies any UTI but she has some vaginal itching which subsides with Monistat. Regarding diabetic retinopathy: Last diabetic eye exam was in April 2021 and she did not need any more laser treatment. Next appointment is in October 2021. Glucometer reading: Reviewed patient's glucose logs.   Readings are ranging from 73 to 239 mg/dL    Updated diabetes history:  · Diagnosis: 20 years    · Current treatment: Levemir 25 units at bedtime, Victoza 1.8 mg daily, metformin 1000 mg twice a day, Jardiance 25 mg daily, glimepiride 1 mg twice a day    · Past treatment: Glimepiride    · Glucose checks: fasting daily 200s    · Hyperglycemia: yes    · Hypoglycemia: no    · Meals per day: 3, breakfast: eggs and toast, lunch: salad and sandwich, dinner: meat and veg, starch, bedtime snacks: pie, cereal, snacks: nuts cheese sticks     · Exercise: unable to    · DM related hospitalizations: no        Complications of DM:    · CAD: no    · CVA: no    · PVD: no    · Amputations: no     · Retinopathy: yes definitive diabetic retinopathy right eye, moderate nonproliferative diabetic retinopathy left eye, last exam was November 2020, panretinal photocoagulation was recommended    · Gastropathy: no    · Nephropathy: no    · Neuropathy: yes        Medications:    · Statin: simvastatin 40    · ACE-I: benazepril    · ASA: yes        · Diabetes education: long time back    Past Medical History:   Diagnosis Date    Essential hypertension, malignant     Frozen shoulder     History of mammogram 12/15/2020    Mixed hyperlipidemia     Type II or unspecified type diabetes mellitus with renal manifestations, uncontrolled(250.42) ([facility] [address]      Past Surgical History:   Procedure Laterality Date    HX COLONOSCOPY  08/16/2017    and 6/7/2005- no longer indicated per 8/16/2017 note    HX TONSILLECTOMY      HX TUBAL LIGATION        Family History   Problem Relation Age of Onset    Heart Disease Mother     Heart Failure Mother     Cancer Father         Lung    Atrial Fibrillation Father     Diabetes Sister     Diabetes Maternal Aunt     Hypertension Paternal Aunt      Social History     Tobacco Use    Smoking status: Never Smoker    Smokeless tobacco: Never Used   Substance Use Topics    Alcohol use: Never      Prior to Admission medications    Medication Sig Start Date End Date Taking? Authorizing Provider   semaglutide (Ozempic) 0.25 mg or 0.5 mg/dose (2 mg/1.5 ml) subq pen 0.5 mg by SubCUTAneous route every seven (7) days for 90 days. 9/22/21 12/21/21 Yes Maritza Yancey MD   glimepiride (AMARYL) 1 mg tablet Take 1 Tablet by mouth two (2) times a day for 90 days.  9/22/21 12/21/21 Yes Maritza Yancey MD   empagliflozin (Jardiance) 25 mg tablet Once a day, 90 days 9/22/21  Yes Maritza Yancey MD   Levemir FlexTouch U-100 Insuln 100 unit/mL (3 mL) inpn Inject 25 units at bedtime, 90 days 9/22/21  Yes Maritza Yancey MD   metFORMIN (GLUCOPHAGE) 1,000 mg tablet TAKE 1 TABLET BY MOUTH TWICE DAILY WITH MEALS, 90 days 9/22/21  Yes MD rachel Martinez, diabetic (Comfort EZ Pen Needles) 33 gauge x 1/4\" ndle Twice a day, 90 days 9/22/21  Yes MD Chance Marshalluch Delica Plus Lancet 30 gauge misc 3 times a day, 90 days 9/22/21  Yes Anahi Mauro MD   glucose blood VI test strips (ASCENSIA AUTODISC VI, ONE TOUCH ULTRA TEST VI) strip Use to check glucose 3 times a day 9/22/21  Yes Anahi Mauro MD   benazepriL (LOTENSIN) 20 mg tablet Take 1 tablet by mouth once daily 7/30/21  Yes Ashley Randolph NP   simvastatin (ZOCOR) 40 mg tablet TAKE 1 TABLET BY MOUTH ONCE DAILY IN THE EVENING 2/3/21  Yes Ashley Randolph NP   omega-3 acid ethyl esters (LOVAZA) 1 gram capsule Take 2 g by mouth two (2) times a day. Yes Provider, Historical   cyanocobalamin (Vitamin B-12) 100 mcg tablet Take 100 mcg by mouth daily. Yes Provider, Historical   Cetirizine (ZyrTEC) 10 mg cap Take  by mouth. Yes Provider, Historical        Allergies   Allergen Reactions    Flonase [Fluticasone Propionate] Unknown (comments)    Nasonex [Mometasone] Unknown (comments)       Review of Systems:  ROS    A comprehensive review of systems was preformed and it is negative except mentioned in HPI    Objective:     Vitals:    09/22/21 1258   BP: 101/62   Pulse: (!) 102   Temp: 97.1 °F (36.2 °C)   TempSrc: Temporal   SpO2: 97%   Weight: 192 lb 3.2 oz (87.2 kg)   Height: 5' 7\" (1.702 m)        Physical Exam:    Physical Exam  Vitals and nursing note reviewed. Constitutional:       Appearance: Normal appearance. HENT:      Head: Normocephalic and atraumatic. Eyes:      Extraocular Movements: Extraocular movements intact. Neurological:      General: No focal deficit present. Mental Status: She is alert. Psychiatric:         Mood and Affect: Mood normal.         Behavior: Behavior normal.         Thought Content: Thought content normal.         Judgment: Judgment normal.          Labs and Imaging:  Results for Oscar Boateng (MRN [id number]) as of 3/2/2021 10:24   Ref.  Range 2/23/2021 11:46 Sodium Latest Ref Range: 134 - 144 mmol/L 142   Potassium Latest Ref Range: 3.5 - 5.2 mmol/L 4.4   Chloride Latest Ref Range: 96 - 106 mmol/L 103   CO2 Latest Ref Range: 20 - 29 mmol/L 20   Glucose Latest Ref Range: 65 - 99 mg/dL 225 (H)   BUN Latest Ref Range: 8 - 27 mg/dL 22   Creatinine Latest Ref Range: 0.57 - 1.00 mg/dL 0.79   BUN/Creatinine ratio Latest Ref Range: 12 - 28  28   Calcium Latest Ref Range: 8.7 - 10.3 mg/dL 9.8   GFR est non-AA Latest Ref Range: >59 mL/min/1.73 77   GFR est AA Latest Ref Range: >59 mL/min/1.73 88   Bilirubin, total Latest Ref Range: 0.0 - 1.2 mg/dL 0.7   Protein, total Latest Ref Range: 6.0 - 8.5 g/dL 6.3   Albumin Latest Ref Range: 3.8 - 4.8 g/dL 4.2   A-G Ratio Latest Ref Range: 1.2 - 2.2  2.0   ALT Latest Ref Range: 0 - 32 IU/L 8   AST Latest Ref Range: 0 - 40 IU/L 11   Alk. phosphatase Latest Ref Range: 39 - 117 IU/L 90     Results for Wilton Michelle (MRN [id number]) as of 3/2/2021 10:24   Ref. Range 2/23/2021 11:46   Triglyceride Latest Ref Range: 0 - 149 mg/dL 154 (H)   Cholesterol, total Latest Ref Range: 100 - 199 mg/dL 142   HDL Cholesterol Latest Ref Range: >39 mg/dL 51   VLDL, calculated Latest Ref Range: 5 - 40 mg/dL 26   LDL, calculated Latest Ref Range: 0 - 99 mg/dL 65   Results for Wilton Michelle (MRN [id number]) as of 3/2/2021 10:24   Ref. Range 2/23/2021 11:46   Creatinine, urine Latest Ref Range: Not Estab. mg/dL 61.9   Microalbumin, urine Latest Ref Range: Not Estab. ug/mL <3.0   Microalbumin/Creat. Ratio Latest Ref Range: 0 - 29 mg/g creat <5   Results for Wilton Michelle (MRN [id number]) as of 3/2/2021 10:24   Ref.  Range 2/23/2021 11:46   TSH Latest Ref Range: 0.450 - 4.500 uIU/mL 1.130     Last 3 Recorded Weights in this Encounter    09/22/21 1258   Weight: 192 lb 3.2 oz (87.2 kg)        No results found for: HBA1C, [id number], LLH2FKEA, [id number]     Assessment:     Patient Active Problem List   Diagnosis Code    Mixed hyperlipidemia E78.2    Type II or unspecified type diabetes mellitus with renal manifestations, uncontrolled(250.42) ([facility] [address] E11.29, E11.65    Moderate nonproliferative diabetic retinopathy of both eyes without macular edema associated with type 2 [facility] mellitus [facility] L11.2243    Cobalamin deficiency E53.8    Type 2 diabetes mellitus with hyperglycemia, with long-term current use of insulin ([facility]) E11.65, Z79.4    Benign essential HTN I10    Diabetic retinopathy associated with type 2 diabetes mellitus ([facility] E11.319           Plan:     type II diabetes mellitus uncontrolled   Hemoglobin A1c was 8.4% on 3993[id number], 7.7% on 2-, 9.1% on 7-, 8.1% on 2-2-2021, 7.3% on 5-, 7.7% today. Fingerstick blood glucose is 126 mg/dL in my office today. Up to date with diabetes related annual labs: yes 2-2021  Up to date with diabetic eye exam: yes    plan:  Glucose log is not bad. There are some readings in 70s and 80s fasting in the morning, but this is not frequent. Discussed with patient if she has nocturnal hypoglycemia, she should let me know so we can cut back further on Levemir. For now continue Levemir 25 units at bedtime, metformin 1000 mg twice a day, Jardiance 25 mg daily, Amaryl 1 mg twice a day. Victoza is not helping with appetite control. So I am going to switch to Ozempic 0.5 mg weekly to see if that would work better. Check blood glucose twice a day and bring glucometer to next visit in 3 months. If she starts to have low blood glucose during the day after we start Ozempic, she should let me know so I will stop Amaryl. mixed hyperlipidemia   LDL 54 in November 2019, 65 in February 2021. continue simvastatin 40 mg daily. essential hypertension   blood pressure is acceptable today. No microalbuminuria February 2021. Diabetic retinopathy:  S/p laser surgery right eye. Last exam was in 4-2021 and she did not require any more laser surgery. Next exam in .     Orders Placed This Encounter    AMB POC GLUCOSE BLOOD, BY GLUCOSE MONITORING DEVICE    AMB POC HEMOGLOBIN A1C    semaglutide (Ozempic) 0.25 mg or 0.5 mg/dose (2 mg/1.5 ml) subq pen     Si.5 mg by SubCUTAneous route every seven (7) days for 90 days. Dispense:  3 Box     Refill:  1     DC victoza    glimepiride (AMARYL) 1 mg tablet     Sig: Take 1 Tablet by mouth two (2) times a day for 90 days.      Dispense:  180 Tablet     Refill:  1    empagliflozin (Jardiance) 25 mg tablet     Sig: Once a day, 90 days     Dispense:  90 Tablet     Refill:  1    Levemir FlexTouch U-100 Insuln 100 unit/mL (3 mL) inpn     Sig: Inject 25 units at bedtime, 90 days     Dispense:  9 Adjustable Dose Pre-filled Pen Syringe     Refill:  1    metFORMIN (GLUCOPHAGE) 1,000 mg tablet     Sig: TAKE 1 TABLET BY MOUTH TWICE DAILY WITH MEALS, 90 days     Dispense:  180 Tablet     Refill:  1    pen needle, diabetic (Comfort EZ Pen Needles) 33 gauge x 1/4\" ndle     Sig: Twice a day, 90 days     Dispense:  200 Each     Refill:  1    OneTouch Delica Plus Lancet 30 gauge misc     Sig: 3 times a day, 90 days     Dispense:  300 Lancet     Refill:  1    glucose blood VI test strips (ASCENSIA AUTODISC VI, ONE TOUCH ULTRA TEST VI) strip     Sig: Use to check glucose 3 times a day     Dispense:  300 Strip     Refill:  1        Signed By: Aundrea Randle MD     2021      Return to clinic 3 months

## 2021-09-22 NOTE — LETTER
9/22/2021    Patient: Lakeisha Stewart   YOB: 1950   Date of Visit: 9/22/2021     Sean Medeiros NP  [address] [place] [address] Rd  [address] [address]  [address] [place]    Dear Sean Medeiros NP,      Thank you for referring Ms. Po Kim to [address] [place] [address] ENDOCRINOLOGY for evaluation. My notes for this consultation are attached. If you have questions, please do not hesitate to call me. I look forward to following your patient along with you.       Sincerely,    Heather Napoles MD

## 2021-11-03 RX ORDER — SIMVASTATIN 40 MG/1
TABLET, FILM COATED ORAL
Qty: 90 TABLET | Refills: 3 | Status: SHIPPED | OUTPATIENT
Start: 2021-11-03 | End: 2022-05-05 | Stop reason: SDUPTHER

## 2021-12-15 DIAGNOSIS — Z79.4 TYPE 2 DIABETES MELLITUS WITH HYPERGLYCEMIA, WITH LONG-TERM CURRENT USE OF INSULIN (HCC): ICD-10-CM

## 2021-12-15 DIAGNOSIS — E11.65 TYPE 2 DIABETES MELLITUS WITH HYPERGLYCEMIA, WITH LONG-TERM CURRENT USE OF INSULIN (HCC): ICD-10-CM

## 2021-12-15 RX ORDER — GLIMEPIRIDE 1 MG/1
TABLET ORAL
Qty: 180 TABLET | Refills: 0 | Status: SHIPPED | OUTPATIENT
Start: 2021-12-15 | End: 2021-12-22 | Stop reason: CLARIF

## 2021-12-22 ENCOUNTER — OFFICE VISIT (OUTPATIENT)
Dept: ENDOCRINOLOGY | Age: 71
End: 2021-12-22
Payer: MEDICARE

## 2021-12-22 VITALS
HEIGHT: 67 IN | DIASTOLIC BLOOD PRESSURE: 72 MMHG | HEART RATE: 100 BPM | OXYGEN SATURATION: 98 % | SYSTOLIC BLOOD PRESSURE: 129 MMHG | WEIGHT: 194 LBS | BODY MASS INDEX: 30.45 KG/M2 | TEMPERATURE: 97.5 F

## 2021-12-22 DIAGNOSIS — Z79.4 TYPE 2 DIABETES MELLITUS WITH HYPERGLYCEMIA, WITH LONG-TERM CURRENT USE OF INSULIN (HCC): Primary | ICD-10-CM

## 2021-12-22 DIAGNOSIS — E11.65 TYPE 2 DIABETES MELLITUS WITH HYPERGLYCEMIA, WITH LONG-TERM CURRENT USE OF INSULIN (HCC): Primary | ICD-10-CM

## 2021-12-22 LAB
GLUCOSE POC: 179 MG/DL
HBA1C MFR BLD HPLC: 7.7 %

## 2021-12-22 PROCEDURE — G8754 DIAS BP LESS 90: HCPCS | Performed by: INTERNAL MEDICINE

## 2021-12-22 PROCEDURE — 1101F PT FALLS ASSESS-DOCD LE1/YR: CPT | Performed by: INTERNAL MEDICINE

## 2021-12-22 PROCEDURE — 99214 OFFICE O/P EST MOD 30 MIN: CPT | Performed by: INTERNAL MEDICINE

## 2021-12-22 PROCEDURE — G8400 PT W/DXA NO RESULTS DOC: HCPCS | Performed by: INTERNAL MEDICINE

## 2021-12-22 PROCEDURE — 2022F DILAT RTA XM EVC RTNOPTHY: CPT | Performed by: INTERNAL MEDICINE

## 2021-12-22 PROCEDURE — 1090F PRES/ABSN URINE INCON ASSESS: CPT | Performed by: INTERNAL MEDICINE

## 2021-12-22 PROCEDURE — G8752 SYS BP LESS 140: HCPCS | Performed by: INTERNAL MEDICINE

## 2021-12-22 PROCEDURE — 3017F COLORECTAL CA SCREEN DOC REV: CPT | Performed by: INTERNAL MEDICINE

## 2021-12-22 PROCEDURE — G8427 DOCREV CUR MEDS BY ELIG CLIN: HCPCS | Performed by: INTERNAL MEDICINE

## 2021-12-22 PROCEDURE — G8510 SCR DEP NEG, NO PLAN REQD: HCPCS | Performed by: INTERNAL MEDICINE

## 2021-12-22 PROCEDURE — 83036 HEMOGLOBIN GLYCOSYLATED A1C: CPT | Performed by: INTERNAL MEDICINE

## 2021-12-22 PROCEDURE — G9899 SCRN MAM PERF RSLTS DOC: HCPCS | Performed by: INTERNAL MEDICINE

## 2021-12-22 PROCEDURE — G8417 CALC BMI ABV UP PARAM F/U: HCPCS | Performed by: INTERNAL MEDICINE

## 2021-12-22 PROCEDURE — 82962 GLUCOSE BLOOD TEST: CPT | Performed by: INTERNAL MEDICINE

## 2021-12-22 PROCEDURE — 3051F HG A1C>EQUAL 7.0%<8.0%: CPT | Performed by: INTERNAL MEDICINE

## 2021-12-22 PROCEDURE — G8536 NO DOC ELDER MAL SCRN: HCPCS | Performed by: INTERNAL MEDICINE

## 2021-12-22 RX ORDER — LANCETS 30 GAUGE
EACH MISCELLANEOUS
Qty: 300 LANCET | Refills: 1 | Status: SHIPPED | OUTPATIENT
Start: 2021-12-22

## 2021-12-22 RX ORDER — INSULIN DETEMIR 100 [IU]/ML
INJECTION, SOLUTION SUBCUTANEOUS
Qty: 9 ADJUSTABLE DOSE PRE-FILLED PEN SYRINGE | Refills: 1 | Status: SHIPPED | OUTPATIENT
Start: 2021-12-22 | End: 2022-05-05 | Stop reason: SDUPTHER

## 2021-12-22 RX ORDER — SEMAGLUTIDE 1.34 MG/ML
1 INJECTION, SOLUTION SUBCUTANEOUS
Qty: 3 EACH | Refills: 2 | Status: SHIPPED | OUTPATIENT
Start: 2021-12-22 | End: 2022-03-22

## 2021-12-22 RX ORDER — INSULIN DETEMIR 100 [IU]/ML
INJECTION, SOLUTION SUBCUTANEOUS
Qty: 9 ADJUSTABLE DOSE PRE-FILLED PEN SYRINGE | Refills: 1 | Status: SHIPPED | OUTPATIENT
Start: 2021-12-22 | End: 2021-12-22 | Stop reason: SDUPTHER

## 2021-12-22 RX ORDER — ALCOHOL ANTISEPTIC PADS
PADS, MEDICATED (EA) TOPICAL
COMMUNITY
Start: 2021-09-27 | End: 2022-05-05 | Stop reason: SDUPTHER

## 2021-12-22 RX ORDER — METFORMIN HYDROCHLORIDE 1000 MG/1
TABLET ORAL
Qty: 180 TABLET | Refills: 1 | Status: SHIPPED | OUTPATIENT
Start: 2021-12-22 | End: 2022-05-05 | Stop reason: SDUPTHER

## 2021-12-22 RX ORDER — GLIPIZIDE 5 MG/1
5 TABLET ORAL 2 TIMES DAILY
Qty: 180 TABLET | Refills: 1 | Status: SHIPPED | OUTPATIENT
Start: 2021-12-22 | End: 2022-03-22

## 2021-12-22 NOTE — LETTER
12/22/2021    Patient: Yamilet Claudio   YOB: 1950   Date of Visit: 12/22/2021     Norma Case NP  [address]  [address] [address]  [address] [facility] [address]    Dear Norma Case NP,      Thank you for referring Ms. Chase Marie to [address] [place] [address] ENDOCRINOLOGY for evaluation. My notes for this consultation are attached. If you have questions, please do not hesitate to call me. I look forward to following your patient along with you.       Sincerely,    Ben Manzo MD

## 2021-12-22 NOTE — PROGRESS NOTES
History and Physical    Patient: Duran Meyers MRN: [id number]  SSN: [id number]    YOB: 1950  Age: 80 y.o. Sex: female      Subjective:      Duran Meyers is a 80 y.o. female with past medical history of hypertension, hyperlipidemia is here for follow-up of type 2 diabetes mellitus. She remains in primary care of RUTHY Calle. At the last visit I continued Levemir 25 units at bedtime, Victoza was switched to Ozempic 0.5 mg weekly, she continues to be on Metformin 1000 mg twice a day, Jardiance 25 mg daily, glimepiride 1 mg twice a day. Starting January 2022 her insurance is not going to cover glimepiride anymore, alternative is glipizide. She did not notice any change in her appetite since starting Ozempic. Weight has not changed significantly since the last visit. Patient admits that she is baking and eating a lot of cookies. Checking blood glucose only morning, fasting. She noticed that if she does not eat a bedtime snack her fasting readings are in the 70s and 80s. Denies symptoms of hypoglycemia but she is constantly snacking throughout the day. Regarding diabetic retinopathy: Last diabetic eye exam was in April 2021 and she did not need any more laser treatment. Last appointment with retina specialist was in September 2021 and she was discharged because she was doing well. Last appointment with ophthalmologist was in early December 2021 and she was told everything is looking stable. Glucometer reading: Checking blood glucose once a day.   Readings are ranging from 76 to 130 mg/dL fasting    Updated diabetes history:  · Diagnosis: 20 years    · Current treatment: Levemir 25 units at bedtime, Ozempic 0.5 mg weekly, metformin 1000 mg twice a day, Jardiance 25 mg daily, glimepiride 1 mg twice a day    · Past treatment: Victoza (stopped when started on Ozempic)    · Glucose checks: fasting daily 200s    · Hyperglycemia: yes    · Hypoglycemia: no    · Meals per day: 3, breakfast: eggs and toast, lunch: salad and sandwich, dinner: meat and veg, starch, bedtime snacks: pie, cereal, snacks: nuts cheese sticks     · Exercise: unable to    · DM related hospitalizations: no        Complications of DM:    · CAD: no    · CVA: no    · PVD: no    · Amputations: no     · Retinopathy: yes definitive diabetic retinopathy right eye, moderate nonproliferative diabetic retinopathy left eye, last exam was November 2020, panretinal photocoagulation was recommended    · Gastropathy: no    · Nephropathy: no    · Neuropathy: yes        Medications:    · Statin: simvastatin 40    · ACE-I: benazepril    · ASA: yes        · Diabetes education: long time back    Past Medical History:   Diagnosis Date    Essential hypertension, malignant     Frozen shoulder     History of mammogram 12/15/2020    Mixed hyperlipidemia     Type II or unspecified type diabetes mellitus with renal manifestations, uncontrolled(250.42) ([facility] [address]      Past Surgical History:   Procedure Laterality Date    HX COLONOSCOPY  08/16/2017    and 6/7/2005- no longer indicated per 8/16/2017 note    HX TONSILLECTOMY      HX TUBAL LIGATION        Family History   Problem Relation Age of Onset    Heart Disease Mother     Heart Failure Mother     Cancer Father         Lung    Atrial Fibrillation Father     Diabetes Sister     Diabetes Maternal Aunt     Hypertension Paternal Aunt      Social History     Tobacco Use    Smoking status: Never Smoker    Smokeless tobacco: Never Used   Substance Use Topics    Alcohol use: Never      Prior to Admission medications    Medication Sig Start Date End Date Taking?  Authorizing Provider   Comfort EZ Pen Needles 33 gauge x 5/32\" ndle  9/27/21  Yes Provider, Historical   empagliflozin (Jardiance) 25 mg tablet Once a day, 90 days 12/22/21  Yes Rochelle Lennon MD   metFORMIN (GLUCOPHAGE) 1,000 mg tablet TAKE 1 TABLET BY MOUTH TWICE DAILY WITH MEALS, 90 days 12/22/21  Yes Rochelle Lennon MD OneTouch Delica Plus Lancet 30 gauge misc 3 times a day, 90 days 12/22/21  Yes Enrrique Felton MD   glucose blood VI test strips (ASCENSIA AUTODISC VI, ONE TOUCH ULTRA TEST VI) strip Use to check glucose 3 times a day 12/22/21  Yes Enrrique Felton MD   semaglutide (Ozempic) 1 mg/dose (4 mg/3 mL) pnij 1 mg by SubCUTAneous route every seven (7) days for 90 days. 12/22/21 3/22/22 Yes Enrrique Felton MD   glipiZIDE (GLUCOTROL) 5 mg tablet Take 1 Tablet by mouth two (2) times a day for 90 days. 20 mins before meals 12/22/21 3/22/22 Yes Enrrique Felton MD   Levemir FlexTouch U-100 Insuln 100 unit/mL (3 mL) inpn Inject 22 units at bedtime, 90 days 12/22/21  Yes Enrrique Felton MD   simvastatin (ZOCOR) 40 mg tablet TAKE 1 TABLET BY MOUTH ONCE DAILY IN THE EVENING 11/3/21  Yes Jered Davalos NP   pen needle, diabetic (Comfort EZ Pen Needles) 33 gauge x 1/4\" ndle Twice a day, 90 days 9/22/21  Yes Enrrique Felton MD   benazepriL (LOTENSIN) 20 mg tablet Take 1 tablet by mouth once daily 7/30/21  Yes Jered Davalos NP   omega-3 acid ethyl esters (LOVAZA) 1 gram capsule Take 2 g by mouth two (2) times a day. Yes Provider, Historical   cyanocobalamin (Vitamin B-12) 100 mcg tablet Take 100 mcg by mouth daily. Yes Provider, Historical   Cetirizine (ZyrTEC) 10 mg cap Take  by mouth. Yes Provider, Historical   semaglutide (Ozempic) 0.25 mg or 0.5 mg/dose (2 mg/1.5 ml) subq pen 0.5 mg by SubCUTAneous route every seven (7) days for 90 days.  9/22/21 12/21/21  Enrrique Felton MD        Allergies   Allergen Reactions    Flonase [Fluticasone Propionate] Unknown (comments)    Nasonex [Mometasone] Unknown (comments)       Review of Systems:  ROS    A comprehensive review of systems was preformed and it is negative except mentioned in HPI    Objective:     Vitals:    12/22/21 1049   BP: 129/72   Pulse: 100   Temp: 97.5 °F (36.4 °C)   TempSrc: Temporal   SpO2: 98%   Weight: 194 lb (88 kg)   Height: 5' 7\" (1.702 m)        Physical Exam:    Physical Exam  Vitals and nursing note reviewed. Constitutional:       Appearance: Normal appearance. HENT:      Head: Normocephalic and atraumatic. Eyes:      Extraocular Movements: Extraocular movements intact. Neurological:      General: No focal deficit present. Mental Status: She is alert. Psychiatric:         Mood and Affect: Mood normal.         Behavior: Behavior normal.         Thought Content: Thought content normal.         Judgment: Judgment normal.          Labs and Imaging:  Results for Maurizio Winters (MRN [id number]) as of 3/2/2021 10:24   Ref. Range 2/23/2021 11:46   Sodium Latest Ref Range: 134 - 144 mmol/L 142   Potassium Latest Ref Range: 3.5 - 5.2 mmol/L 4.4   Chloride Latest Ref Range: 96 - 106 mmol/L 103   CO2 Latest Ref Range: 20 - 29 mmol/L 20   Glucose Latest Ref Range: 65 - 99 mg/dL 225 (H)   BUN Latest Ref Range: 8 - 27 mg/dL 22   Creatinine Latest Ref Range: 0.57 - 1.00 mg/dL 0.79   BUN/Creatinine ratio Latest Ref Range: 12 - 28  28   Calcium Latest Ref Range: 8.7 - 10.3 mg/dL 9.8   GFR est non-AA Latest Ref Range: >59 mL/min/1.73 77   GFR est AA Latest Ref Range: >59 mL/min/1.73 88   Bilirubin, total Latest Ref Range: 0.0 - 1.2 mg/dL 0.7   Protein, total Latest Ref Range: 6.0 - 8.5 g/dL 6.3   Albumin Latest Ref Range: 3.8 - 4.8 g/dL 4.2   A-G Ratio Latest Ref Range: 1.2 - 2.2  2.0   ALT Latest Ref Range: 0 - 32 IU/L 8   AST Latest Ref Range: 0 - 40 IU/L 11   Alk. phosphatase Latest Ref Range: 39 - 117 IU/L 90     Results for Maurizio Winters (MRN [id number]) as of 3/2/2021 10:24   Ref. Range 2/23/2021 11:46   Triglyceride Latest Ref Range: 0 - 149 mg/dL 154 (H)   Cholesterol, total Latest Ref Range: 100 - 199 mg/dL 142   HDL Cholesterol Latest Ref Range: >39 mg/dL 51   VLDL, calculated Latest Ref Range: 5 - 40 mg/dL 26   LDL, calculated Latest Ref Range: 0 - 99 mg/dL 65   Results for Maurizio Winters (MRN [id number]) as of 3/2/2021 10:24   Ref.  Range 2/23/2021 11:46 Creatinine, urine Latest Ref Range: Not Estab. mg/dL 61.9   Microalbumin, urine Latest Ref Range: Not Estab. ug/mL <3.0   Microalbumin/Creat. Ratio Latest Ref Range: 0 - 29 mg/g creat <5   Results for David Wood (MRN [id number]) as of 3/2/2021 10:24   Ref. Range 2/23/2021 11:46   TSH Latest Ref Range: 0.450 - 4.500 uIU/mL 1.130     Last 3 Recorded Weights in this Encounter    12/22/21 1049   Weight: 194 lb (88 kg)        No results found for: HBA1C, XLG5AOPL, JMM0VGHV, AWV0WGSU     Assessment:     Patient Active Problem List   Diagnosis Code    Mixed hyperlipidemia E78.2    Type II or unspecified type diabetes mellitus with renal manifestations, uncontrolled(250.42) ([address] E11.29, E11.65    Moderate nonproliferative diabetic retinopathy of both eyes without macular edema associated with type 2 diabetes mellitus ([facility] [address] E95.0777    Cobalamin deficiency E53.8    Type 2 diabetes mellitus with hyperglycemia, with long-term current use of insulin ([address] E11.65, Z79.4    Benign essential HTN I10    Diabetic retinopathy associated with type 2 diabetes mellitus ([facility] [address] E11.319           Plan:     type II diabetes mellitus uncontrolled   Hemoglobin A1c was 8.4% on 11-, 7.7% on 2-, 9.1% on 7-, 8.1% on 2-2-2021, 7.3% on 5-, 7.7% 9-, 7.7% today. Fingerstick blood glucose is 179 mg/dL in my office today. Up to date with diabetes related annual labs: yes 2-2021  Up to date with diabetic eye exam: yes    plan:  Discussed with patient importance of cutting back on snacking, especially cookies and other sweets. Patient plans to work on her diet after the holidays. Switching from Amaryl to glipizide 5 mg twice a day because of insurance. Decrease Levemir from 25 units to 22 units at bedtime because fasting readings are on the lower side. Increase Ozempic from 0.5 mg to 1 mg weekly. Continue Metformin 1000 mg twice a day, Jardiance 25 mg daily.   Start checking blood glucose twice a day every day and I will see her back in 3 months with labs prior to the visit. mixed hyperlipidemia   LDL 54 in November 2019, 65 in February 2021. continue simvastatin 40 mg daily. Check lipid profile prior to next visit.    essential hypertension   Blood pressure well controlled on current medications. Continue the same. No microalbuminuria February 2021. Diabetic retinopathy:  S/p laser surgery right eye. Last exam was in 4-2021 and she did not require any more laser surgery. Last appointment with retina specialist was in September 2021 and she was discharged because she was stable. Last appointment with ophthalmologist was in early December 2021.     Orders Placed This Encounter    LIPID PANEL     Standing Status:   Future     Standing Expiration Date:   6/31/5479    METABOLIC PANEL, COMPREHENSIVE     Standing Status:   Future     Standing Expiration Date:   6/22/2022    MICROALBUMIN, UR, RAND W/ MICROALB/CREAT RATIO     Standing Status:   Future     Standing Expiration Date:   6/22/2022    TSH RFX ON ABNORMAL TO FREE T4     Standing Status:   Future     Standing Expiration Date:   6/22/2022    AMB POC GLUCOSE BLOOD, BY GLUCOSE MONITORING DEVICE    AMB POC HEMOGLOBIN A1C    empagliflozin (Jardiance) 25 mg tablet     Sig: Once a day, 90 days     Dispense:  90 Tablet     Refill:  1    metFORMIN (GLUCOPHAGE) 1,000 mg tablet     Sig: TAKE 1 TABLET BY MOUTH TWICE DAILY WITH MEALS, 90 days     Dispense:  180 Tablet     Refill:  1    DISCONTD: Levemir FlexTouch U-100 Insuln 100 unit/mL (3 mL) inpn     Sig: Inject 25 units at bedtime, 90 days     Dispense:  9 Adjustable Dose Pre-filled Pen Syringe     Refill:  1    OneTouch Delica Plus Lancet 30 gauge misc     Sig: 3 times a day, 90 days     Dispense:  300 Lancet     Refill:  1    glucose blood VI test strips (ASCENSIA AUTODISC VI, ONE TOUCH ULTRA TEST VI) strip     Sig: Use to check glucose 3 times a day     Dispense:  300 Strip     Refill:  1    semaglutide (Ozempic) 1 mg/dose (4 mg/3 mL) pnij     Si mg by SubCUTAneous route every seven (7) days for 90 days. Dispense:  3 Each     Refill:  2     DC ozempic 0.5 mg weekly    glipiZIDE (GLUCOTROL) 5 mg tablet     Sig: Take 1 Tablet by mouth two (2) times a day for 90 days.  20 mins before meals     Dispense:  180 Tablet     Refill:  1     DC glimepiride    Levemir FlexTouch U-100 Insuln 100 unit/mL (3 mL) inpn     Sig: Inject 22 units at bedtime, 90 days     Dispense:  9 Adjustable Dose Pre-filled Pen Syringe     Refill:  1     Dose decrease        Signed By: Angela Vigil MD     2021      Return to clinic 3 months

## 2021-12-29 DIAGNOSIS — Z78.0 ASYMPTOMATIC POSTMENOPAUSAL STATE: ICD-10-CM

## 2022-01-05 DIAGNOSIS — Z12.31 ENCOUNTER FOR SCREENING MAMMOGRAM FOR MALIGNANT NEOPLASM OF BREAST: ICD-10-CM

## 2022-02-01 RX ORDER — BENAZEPRIL HYDROCHLORIDE 20 MG/1
TABLET ORAL
Qty: 90 TABLET | Refills: 0 | Status: SHIPPED | OUTPATIENT
Start: 2022-02-01 | End: 2022-05-09

## 2022-02-07 NOTE — TELEPHONE ENCOUNTER
Patient scheduled for 3/25/22 and she also is having labs done for Dr Efrain Singh first of March and wants to know if you could order labs for her visit with you so she can get it all done at one time.

## 2022-03-05 LAB
ALBUMIN SERPL-MCNC: 4.4 G/DL (ref 3.8–4.8)
ALBUMIN/CREAT UR: <3 MG/G CREAT (ref 0–29)
ALBUMIN/GLOB SERPL: 1.9 {RATIO} (ref 1.2–2.2)
ALP SERPL-CCNC: 82 IU/L (ref 44–121)
ALT SERPL-CCNC: 7 IU/L (ref 0–32)
AST SERPL-CCNC: 14 IU/L (ref 0–40)
BILIRUB SERPL-MCNC: 0.7 MG/DL (ref 0–1.2)
BUN SERPL-MCNC: 23 MG/DL (ref 8–27)
BUN/CREAT SERPL: 28 (ref 12–28)
CALCIUM SERPL-MCNC: 9.7 MG/DL (ref 8.7–10.3)
CHLORIDE SERPL-SCNC: 104 MMOL/L (ref 96–106)
CHOLEST SERPL-MCNC: 149 MG/DL (ref 100–199)
CO2 SERPL-SCNC: 19 MMOL/L (ref 20–29)
CREAT SERPL-MCNC: 0.81 MG/DL (ref 0.57–1)
CREAT UR-MCNC: 118 MG/DL
EGFR: 78 ML/MIN/1.73
GLOBULIN SER CALC-MCNC: 2.3 G/DL (ref 1.5–4.5)
GLUCOSE SERPL-MCNC: 120 MG/DL (ref 65–99)
HDLC SERPL-MCNC: 59 MG/DL
LDLC SERPL CALC-MCNC: 68 MG/DL (ref 0–99)
MICROALBUMIN UR-MCNC: <3 UG/ML
POTASSIUM SERPL-SCNC: 4.3 MMOL/L (ref 3.5–5.2)
PROT SERPL-MCNC: 6.7 G/DL (ref 6–8.5)
SODIUM SERPL-SCNC: 141 MMOL/L (ref 134–144)
TRIGL SERPL-MCNC: 125 MG/DL (ref 0–149)
TSH SERPL DL<=0.005 MIU/L-ACNC: 0.99 UIU/ML (ref 0.45–4.5)
VLDLC SERPL CALC-MCNC: 22 MG/DL (ref 5–40)

## 2022-03-19 PROBLEM — I10 BENIGN ESSENTIAL HTN: Status: ACTIVE | Noted: 2021-03-02

## 2022-03-19 PROBLEM — Z79.4 TYPE 2 DIABETES MELLITUS WITH HYPERGLYCEMIA, WITH LONG-TERM CURRENT USE OF INSULIN (HCC): Status: ACTIVE | Noted: 2021-03-02

## 2022-03-19 PROBLEM — E11.29 TYPE II OR UNSPECIFIED TYPE DIABETES MELLITUS WITH RENAL MANIFESTATIONS, UNCONTROLLED(250.42): Status: ACTIVE | Noted: 2020-07-16

## 2022-03-19 PROBLEM — E11.65 TYPE 2 DIABETES MELLITUS WITH HYPERGLYCEMIA, WITH LONG-TERM CURRENT USE OF INSULIN (HCC): Status: ACTIVE | Noted: 2021-03-02

## 2022-03-19 PROBLEM — E11.65 TYPE II OR UNSPECIFIED TYPE DIABETES MELLITUS WITH RENAL MANIFESTATIONS, UNCONTROLLED(250.42): Status: ACTIVE | Noted: 2020-07-16

## 2022-03-19 PROBLEM — E78.2 MIXED HYPERLIPIDEMIA: Status: ACTIVE | Noted: 2020-07-16

## 2022-03-20 PROBLEM — E11.319 DIABETIC RETINOPATHY ASSOCIATED WITH TYPE 2 DIABETES MELLITUS (HCC): Status: ACTIVE | Noted: 2021-05-18

## 2022-03-22 DIAGNOSIS — E11.65 TYPE 2 DIABETES MELLITUS WITH HYPERGLYCEMIA, WITH LONG-TERM CURRENT USE OF INSULIN (HCC): ICD-10-CM

## 2022-03-22 DIAGNOSIS — Z79.4 TYPE 2 DIABETES MELLITUS WITH HYPERGLYCEMIA, WITH LONG-TERM CURRENT USE OF INSULIN (HCC): ICD-10-CM

## 2022-03-25 ENCOUNTER — OFFICE VISIT (OUTPATIENT)
Dept: FAMILY MEDICINE CLINIC | Age: 72
End: 2022-03-25
Payer: MEDICARE

## 2022-03-25 VITALS
TEMPERATURE: 96.9 F | HEART RATE: 79 BPM | OXYGEN SATURATION: 96 % | HEIGHT: 67 IN | DIASTOLIC BLOOD PRESSURE: 72 MMHG | SYSTOLIC BLOOD PRESSURE: 110 MMHG | BODY MASS INDEX: 30.61 KG/M2 | WEIGHT: 195 LBS

## 2022-03-25 DIAGNOSIS — E66.09 CLASS 1 OBESITY DUE TO EXCESS CALORIES WITH SERIOUS COMORBIDITY AND BODY MASS INDEX (BMI) OF 30.0 TO 30.9 IN ADULT: ICD-10-CM

## 2022-03-25 DIAGNOSIS — E78.2 MIXED HYPERLIPIDEMIA: ICD-10-CM

## 2022-03-25 DIAGNOSIS — Z12.31 ENCOUNTER FOR SCREENING MAMMOGRAM FOR MALIGNANT NEOPLASM OF BREAST: ICD-10-CM

## 2022-03-25 DIAGNOSIS — Z13.39 ALCOHOL SCREENING: ICD-10-CM

## 2022-03-25 DIAGNOSIS — G89.29 CHRONIC BILATERAL LOW BACK PAIN WITHOUT SCIATICA: ICD-10-CM

## 2022-03-25 DIAGNOSIS — Z71.89 ADVANCED DIRECTIVES, COUNSELING/DISCUSSION: ICD-10-CM

## 2022-03-25 DIAGNOSIS — I10 BENIGN ESSENTIAL HTN: ICD-10-CM

## 2022-03-25 DIAGNOSIS — M54.50 CHRONIC BILATERAL LOW BACK PAIN WITHOUT SCIATICA: ICD-10-CM

## 2022-03-25 DIAGNOSIS — E11.65 TYPE 2 DIABETES MELLITUS WITH HYPERGLYCEMIA, WITH LONG-TERM CURRENT USE OF INSULIN (HCC): ICD-10-CM

## 2022-03-25 DIAGNOSIS — Z13.31 DEPRESSION SCREENING: ICD-10-CM

## 2022-03-25 DIAGNOSIS — Z79.4 TYPE 2 DIABETES MELLITUS WITH HYPERGLYCEMIA, WITH LONG-TERM CURRENT USE OF INSULIN (HCC): ICD-10-CM

## 2022-03-25 DIAGNOSIS — Z00.00 MEDICARE ANNUAL WELLNESS VISIT, SUBSEQUENT: Primary | ICD-10-CM

## 2022-03-25 DIAGNOSIS — Z91.81 AT LOW RISK FOR FALL: ICD-10-CM

## 2022-03-25 PROCEDURE — G8432 DEP SCR NOT DOC, RNG: HCPCS | Performed by: NURSE PRACTITIONER

## 2022-03-25 PROCEDURE — G9899 SCRN MAM PERF RSLTS DOC: HCPCS | Performed by: NURSE PRACTITIONER

## 2022-03-25 PROCEDURE — 3046F HEMOGLOBIN A1C LEVEL >9.0%: CPT | Performed by: NURSE PRACTITIONER

## 2022-03-25 PROCEDURE — G8417 CALC BMI ABV UP PARAM F/U: HCPCS | Performed by: NURSE PRACTITIONER

## 2022-03-25 PROCEDURE — G8754 DIAS BP LESS 90: HCPCS | Performed by: NURSE PRACTITIONER

## 2022-03-25 PROCEDURE — G8399 PT W/DXA RESULTS DOCUMENT: HCPCS | Performed by: NURSE PRACTITIONER

## 2022-03-25 PROCEDURE — 99214 OFFICE O/P EST MOD 30 MIN: CPT | Performed by: NURSE PRACTITIONER

## 2022-03-25 PROCEDURE — G0439 PPPS, SUBSEQ VISIT: HCPCS | Performed by: NURSE PRACTITIONER

## 2022-03-25 PROCEDURE — 1090F PRES/ABSN URINE INCON ASSESS: CPT | Performed by: NURSE PRACTITIONER

## 2022-03-25 PROCEDURE — 3017F COLORECTAL CA SCREEN DOC REV: CPT | Performed by: NURSE PRACTITIONER

## 2022-03-25 PROCEDURE — G8536 NO DOC ELDER MAL SCRN: HCPCS | Performed by: NURSE PRACTITIONER

## 2022-03-25 PROCEDURE — 1101F PT FALLS ASSESS-DOCD LE1/YR: CPT | Performed by: NURSE PRACTITIONER

## 2022-03-25 PROCEDURE — 2022F DILAT RTA XM EVC RTNOPTHY: CPT | Performed by: NURSE PRACTITIONER

## 2022-03-25 PROCEDURE — G8752 SYS BP LESS 140: HCPCS | Performed by: NURSE PRACTITIONER

## 2022-03-25 PROCEDURE — G8427 DOCREV CUR MEDS BY ELIG CLIN: HCPCS | Performed by: NURSE PRACTITIONER

## 2022-03-25 RX ORDER — SEMAGLUTIDE 1.34 MG/ML
1 INJECTION, SOLUTION SUBCUTANEOUS
COMMUNITY
End: 2022-05-05 | Stop reason: SDUPTHER

## 2022-03-25 NOTE — PROGRESS NOTES
Chief Complaint   Patient presents with   Alexus Jaramillo Annual Wellness Visit     Medicare Wellness     1. \"Have you been to the ER, urgent care clinic since your last visit? Hospitalized since your last visit? \" No    2. \"Have you seen or consulted any other health care providers outside of the [address] [place] [address] since your last visit? \" Yes Has seen Chayo (dermatology), Shelbi Lee (dentist) and Anmol(ophthalmologist)     3. For patients aged 39-70: Has the patient had a colonoscopy / FIT/ Cologuard? Yes - Care Gap present. Most recent result on file      If the patient is female:    4. For patients aged 41-77: Has the patient had a mammogram within the past 2 years? Yes - Care Gap present. Most recent result on file      5. For patients aged 21-65: Has the patient had a pap smear? NA - based on age or sex     3 most recent PHQ Screens 3/25/2022   PHQ Not Done -   Little interest or pleasure in doing things Not at all   Feeling down, depressed, irritable, or hopeless Not at all   Total Score PHQ 2 0     Fall Risk Assessment, last 12 mths 3/25/2022   Able to walk? Yes   Fall in past 12 months? 0   Do you feel unsteady?  0   Are you worried about falling 0

## 2022-03-25 NOTE — PROGRESS NOTES
Medicare Wellness Exam:    Chief Complaint   Patient presents with    Annual Wellness Visit     Medicare Wellness     she is a 71y.o. year old female who presents for evaluation for their Medicare Wellness Visit. Patient presents for Medicare wellness and management of hypertension and hyperlipidemia. Medications reviewed, taking as prescribed with no known side effects. Follows with endocrinology for management of type 2 diabetes including all lab work. Simvastatin daily in the evening for hyperlipidemia. Moderate intake of fat/cholesterol. Reports ongoing back pain. Has seen ortho in the past. Xrays and MRI normal. Completed PT. Further follow up was not completed due to COVID restrictions. Management of HTN-  Current meds: Benazepril 20 mg daily  Home BP readings: 110s/70s      Fall Screen is completed and assessed=yes  Depression Screen is completed and assessed=yes  Medication list reviewed and adjusted for accuracy=yes  Immunizations reviewed and updated=yes  Health/Preventative Screenings reviewed and updated=yes  ADL Functions reviewed=yes  MiniCog score= 5/5 (2points for clock, 3/3 for recall)  See scanned medicare wellness documents for full details. Patient Active Problem List    Diagnosis    Obesity    Diabetic retinopathy associated with type 2 diabetes mellitus ([address]    Type 2 diabetes mellitus with hyperglycemia, with long-term current use of insulin ([address]    Benign essential HTN    Cobalamin deficiency    Moderate nonproliferative diabetic retinopathy of both eyes without macular edema associated with type 2 diabetes mellitus ([address]    Mixed hyperlipidemia    Type II or unspecified type diabetes mellitus with renal manifestations, uncontrolled(250.42) ([address]       Reviewed PmHx, RxHx, FmHx, SocHx, AllgHx and updated and dated in the chart. Review of Systems   Constitutional: Negative for chills, fever and weight loss.    HENT: Negative for congestion, ear pain, hearing loss, sinus pain and sore throat. Denies difficulty swallowing. Eyes: Negative for blurred vision. Respiratory: Negative for cough, shortness of breath and wheezing. Cardiovascular: Negative for chest pain, palpitations and leg swelling. Gastrointestinal: Negative for abdominal pain, constipation, diarrhea and heartburn. Genitourinary: Negative for dysuria. Musculoskeletal: Positive for back pain. Negative for joint pain and myalgias. Neurological: Negative for dizziness, tingling, weakness and headaches. Psychiatric/Behavioral: Negative for depression. The patient is not nervous/anxious. Objective:     Vitals:    03/25/22 1112 03/25/22 1135   BP: 110/72    Pulse: (!) 104 79   Temp: 96.9 °F (36.1 °C)    TempSrc: Temporal    SpO2: 98% 96%   Weight: 195 lb (88.5 kg)    Height: 5' 7\" (1.702 m)      Physical Exam  Vitals and nursing note reviewed. Constitutional:       General: She is not in acute distress. Appearance: Normal appearance. She is obese. HENT:      Head: Normocephalic. Eyes:      Extraocular Movements: Extraocular movements intact. Neck:      Thyroid: No thyroid mass, thyromegaly or thyroid tenderness. Cardiovascular:      Rate and Rhythm: Normal rate and regular rhythm. Heart sounds: Normal heart sounds. Pulmonary:      Effort: Pulmonary effort is normal.      Breath sounds: Normal breath sounds. Chest:   Breasts:      Right: No supraclavicular adenopathy. Left: No supraclavicular adenopathy. Musculoskeletal:         General: Normal range of motion. Cervical back: Neck supple. Right lower leg: No edema. Left lower leg: No edema. Lymphadenopathy:      Cervical: No cervical adenopathy. Upper Body:      Right upper body: No supraclavicular adenopathy. Left upper body: No supraclavicular adenopathy. Skin:     General: Skin is warm and dry.    Neurological:      Mental Status: She is alert and oriented to person, place, and time. Psychiatric:         Mood and Affect: Mood normal.         Behavior: Behavior normal.          Assessment/ Plan:   Diagnoses and all orders for this visit:    1. Medicare annual wellness visit, subsequent  [facility] exam completed as documented. 2. Depression screening  Negative depression screening. 3. Alcohol screening  Low risk for alcohol misuse. 4. At low risk for fall  Low fall risk. 5. Advanced directives, counseling/discussion  Requested to provide a copy of advance directives at her convenience. 6. Class 1 obesity due to excess calories with serious comorbidity and body mass index (BMI) of 30.0 to 30.9 in adult  Discussed the positive impact weight loss and regular exercise will have on chronic conditions and overall health. Increase exercise as tolerated. 7. Encounter for screening mammogram for malignant neoplasm of breast  Annual mammogram due in December. 8. Benign essential HTN  BP is below goal in the office today and on reported home readings. No medication changes. Continue to check BP readings regularly and call if consistently greater than 140/90 on either number. Kidney function and electrolytes normal when checked by endocrinology 3/4/2022.    9. Mixed hyperlipidemia  Lipids below goal when checked by endocrinology 3/4/2022. Continue simvastatin daily in the evening. Increase regular exercise as tolerated and limit fat/cholesterol. 10. Type 2 diabetes mellitus with hyperglycemia, with long-term current use of insulin ([address]  Follows with endocrinology for management. Last visit 12/22/2021. Next visit scheduled 5/5/2022. Lab Results   Component Value Date/Time    Hemoglobin A1c (POC) 7.7 12/22/2021 10:55 AM      11.  Chronic bilateral low back pain without sciatica  Referring to previous provider for further evaluation.   -     REFERRAL TO ORTHOPEDICS         -Pain evaluation performed in office  -Cognitive Screen performed in office  -Depression Screen, Fall risks (by up and go test)  and ADL functionality were addressed  -Medication list updated and reviewed for any changes   -A comprehensive review of medical issues and a plan was formulated  -End of life planning was addressed with pt   -Health Screenings for preventions were addressed and a plan was formulated  -Shingles Vaccine was recommended  -Discussed with patient cancer risk factors and appropriate screenings for age  -Patient evaluated for colonoscopy and referred if needed per screeing criteria  -Labs from previous visits were discussed with patient   -Discussed with patient diet and exercise and formulated a plan as needed  -An Advanced care plan was developed with the patient.  -Alcohol screening performed and was negative    -  Follow-up and Dispositions    · Return in about 6 months (around 9/25/2022) for follow up, hypertension, VV ok. I have discussed the diagnosis with the patient and the intended plan as seen in the above orders. The patient understands and agrees with the plan. The patient has received an after-visit summary and questions were answered concerning future plans.      Medication Side Effects and Warnings were discussed with patient  Patient Labs were reviewed and or requested  Patient Past Records were reviewed and or requested        Karla WASHINGTON

## 2022-05-05 ENCOUNTER — OFFICE VISIT (OUTPATIENT)
Dept: ENDOCRINOLOGY | Age: 72
End: 2022-05-05
Payer: MEDICARE

## 2022-05-05 VITALS
OXYGEN SATURATION: 97 % | BODY MASS INDEX: 30.32 KG/M2 | TEMPERATURE: 96.9 F | SYSTOLIC BLOOD PRESSURE: 114 MMHG | WEIGHT: 193.2 LBS | HEART RATE: 106 BPM | DIASTOLIC BLOOD PRESSURE: 73 MMHG | HEIGHT: 67 IN

## 2022-05-05 DIAGNOSIS — Z79.4 TYPE 2 DIABETES MELLITUS WITH HYPERGLYCEMIA, WITH LONG-TERM CURRENT USE OF INSULIN (HCC): Primary | ICD-10-CM

## 2022-05-05 DIAGNOSIS — B37.31 VAGINAL YEAST INFECTION: ICD-10-CM

## 2022-05-05 DIAGNOSIS — E78.2 MIXED HYPERLIPIDEMIA: ICD-10-CM

## 2022-05-05 DIAGNOSIS — E11.65 TYPE 2 DIABETES MELLITUS WITH HYPERGLYCEMIA, WITH LONG-TERM CURRENT USE OF INSULIN (HCC): Primary | ICD-10-CM

## 2022-05-05 DIAGNOSIS — I10 BENIGN ESSENTIAL HTN: ICD-10-CM

## 2022-05-05 LAB
GLUCOSE POC: 149 MG/DL
HBA1C MFR BLD HPLC: 7.4 %

## 2022-05-05 PROCEDURE — 82962 GLUCOSE BLOOD TEST: CPT | Performed by: INTERNAL MEDICINE

## 2022-05-05 PROCEDURE — G8510 SCR DEP NEG, NO PLAN REQD: HCPCS | Performed by: INTERNAL MEDICINE

## 2022-05-05 PROCEDURE — G8427 DOCREV CUR MEDS BY ELIG CLIN: HCPCS | Performed by: INTERNAL MEDICINE

## 2022-05-05 PROCEDURE — 99214 OFFICE O/P EST MOD 30 MIN: CPT | Performed by: INTERNAL MEDICINE

## 2022-05-05 PROCEDURE — 1090F PRES/ABSN URINE INCON ASSESS: CPT | Performed by: INTERNAL MEDICINE

## 2022-05-05 PROCEDURE — G8754 DIAS BP LESS 90: HCPCS | Performed by: INTERNAL MEDICINE

## 2022-05-05 PROCEDURE — G8417 CALC BMI ABV UP PARAM F/U: HCPCS | Performed by: INTERNAL MEDICINE

## 2022-05-05 PROCEDURE — G8399 PT W/DXA RESULTS DOCUMENT: HCPCS | Performed by: INTERNAL MEDICINE

## 2022-05-05 PROCEDURE — 3017F COLORECTAL CA SCREEN DOC REV: CPT | Performed by: INTERNAL MEDICINE

## 2022-05-05 PROCEDURE — G8536 NO DOC ELDER MAL SCRN: HCPCS | Performed by: INTERNAL MEDICINE

## 2022-05-05 PROCEDURE — 3051F HG A1C>EQUAL 7.0%<8.0%: CPT | Performed by: INTERNAL MEDICINE

## 2022-05-05 PROCEDURE — 83036 HEMOGLOBIN GLYCOSYLATED A1C: CPT | Performed by: INTERNAL MEDICINE

## 2022-05-05 PROCEDURE — 2022F DILAT RTA XM EVC RTNOPTHY: CPT | Performed by: INTERNAL MEDICINE

## 2022-05-05 PROCEDURE — G8752 SYS BP LESS 140: HCPCS | Performed by: INTERNAL MEDICINE

## 2022-05-05 PROCEDURE — 1101F PT FALLS ASSESS-DOCD LE1/YR: CPT | Performed by: INTERNAL MEDICINE

## 2022-05-05 PROCEDURE — G9899 SCRN MAM PERF RSLTS DOC: HCPCS | Performed by: INTERNAL MEDICINE

## 2022-05-05 RX ORDER — SEMAGLUTIDE 1.34 MG/ML
1 INJECTION, SOLUTION SUBCUTANEOUS
Qty: 3 BOX | Refills: 1 | Status: SHIPPED | OUTPATIENT
Start: 2022-05-05 | End: 2022-08-03

## 2022-05-05 RX ORDER — GABAPENTIN 300 MG/1
CAPSULE ORAL
COMMUNITY
Start: 2022-04-29 | End: 2022-09-23

## 2022-05-05 RX ORDER — FLUCONAZOLE 150 MG/1
TABLET ORAL
Qty: 2 TABLET | Refills: 0 | Status: SHIPPED | OUTPATIENT
Start: 2022-05-05 | End: 2022-09-23

## 2022-05-05 RX ORDER — SIMVASTATIN 40 MG/1
40 TABLET, FILM COATED ORAL
Qty: 90 TABLET | Refills: 3 | Status: SHIPPED | OUTPATIENT
Start: 2022-05-05 | End: 2022-08-03

## 2022-05-05 RX ORDER — GLIPIZIDE 5 MG/1
5 TABLET ORAL 2 TIMES DAILY
Qty: 180 TABLET | Refills: 2 | Status: SHIPPED | OUTPATIENT
Start: 2022-05-05 | End: 2022-08-03

## 2022-05-05 RX ORDER — ALCOHOL ANTISEPTIC PADS
PADS, MEDICATED (EA) TOPICAL
Qty: 100 EACH | Refills: 3 | Status: SHIPPED | OUTPATIENT
Start: 2022-05-05

## 2022-05-05 RX ORDER — INSULIN DETEMIR 100 [IU]/ML
INJECTION, SOLUTION SUBCUTANEOUS
Qty: 9 ADJUSTABLE DOSE PRE-FILLED PEN SYRINGE | Refills: 1 | Status: SHIPPED | OUTPATIENT
Start: 2022-05-05 | End: 2022-09-26 | Stop reason: SDUPTHER

## 2022-05-05 RX ORDER — METFORMIN HYDROCHLORIDE 1000 MG/1
TABLET ORAL
Qty: 180 TABLET | Refills: 1 | Status: SHIPPED | OUTPATIENT
Start: 2022-05-05

## 2022-05-05 RX ORDER — GLIPIZIDE 5 MG/1
TABLET ORAL
COMMUNITY
Start: 2021-12-22 | End: 2022-05-05 | Stop reason: SDUPTHER

## 2022-05-05 RX ORDER — PIOGLITAZONEHYDROCHLORIDE 15 MG/1
15 TABLET ORAL DAILY
Qty: 30 TABLET | Refills: 3 | Status: SHIPPED | OUTPATIENT
Start: 2022-05-05 | End: 2022-06-04

## 2022-05-05 NOTE — PROGRESS NOTES
History and Physical    Patient: Minh Wall MRN: [id number]  SSN: [id number]    YOB: 1950  Age: 71 y.o. Sex: female      Subjective:      Minh Wall is a 71 y.o. female with past medical history of hypertension, hyperlipidemia is here for follow-up of type 2 diabetes mellitus. She remains in primary care of [facility]. At the last visit Ozempic was increased to 1 mg weekly, we continued metformin 1000 mg twice a day, Jardiance 25 mg daily, glipizide 5 mg twice a day, Levemir was decreased from 25 units to only 22 units at bedtime. Reporting compliance with medications. No change in weight since the last visit. Regarding diet, she is doing her best, still snacks a lot on cookies, etc.  Denies any symptoms of hypoglycemia. Checks blood glucose 1-2 times per day. She is having recurrent burning and discomfort in genital region. Does not want to continue with Jardiance. Regarding diabetic retinopathy: Last diabetic eye exam was in April 2021 and she did not need any more laser treatment. Last appointment with retina specialist was in September 2021 and she was discharged because she was doing well. She is seeing ophthalmologist every 3 months. Last appointment was in April 2022. Glucometer reading: Checking blood glucose usually once to twice.   Readings are ranging from 80 to 169 mg/dL    Updated diabetes history:  · Diagnosis: 20 years    · Current treatment: Levemir 22 units at bedtime, Ozempic 1 mg weekly, metformin 1000 mg twice a day, Jardiance 25 mg daily, glipizide 5 mg bid    · Past treatment: Victoza (stopped when started on Ozempic)    · Glucose checks: fasting daily 200s    · Hyperglycemia: yes    · Hypoglycemia: no    · Meals per day: 3, breakfast: eggs and toast, lunch: salad and sandwich, dinner: meat and veg, starch, bedtime snacks: pie, cereal, snacks: nuts cheese sticks     · Exercise: unable to    · DM related hospitalizations: no        Complications of DM:    · CAD: no    · CVA: no    · PVD: no    · Amputations: no     · Retinopathy: yes definitive diabetic retinopathy right eye, moderate nonproliferative diabetic retinopathy left eye, last exam was November 2020, panretinal photocoagulation was recommended    · Gastropathy: no    · Nephropathy: no    · Neuropathy: yes        Medications:    · Statin: simvastatin 40    · ACE-I: benazepril    · ASA: yes        · Diabetes education: long time back    Past Medical History:   Diagnosis Date    Essential hypertension, malignant     Frozen shoulder     History of mammogram 12/15/2020    Mixed hyperlipidemia     Type II or unspecified type diabetes mellitus with renal manifestations, uncontrolled(250.42) ([facility]      Past Surgical History:   Procedure Laterality Date    HX COLONOSCOPY  08/16/2017    and 6/7/2005- no longer indicated per 8/16/2017 note    HX TONSILLECTOMY      HX TUBAL LIGATION        Family History   Problem Relation Age of Onset    Heart Disease Mother     Heart Failure Mother     Cancer Father         Lung    Atrial Fibrillation Father     Diabetes Sister     Diabetes Maternal Aunt     Hypertension Paternal Aunt      Social History     Tobacco Use    Smoking status: Never Smoker    Smokeless tobacco: Never Used   Substance Use Topics    Alcohol use: Never      Prior to Admission medications    Medication Sig Start Date End Date Taking? Authorizing Provider   gabapentin (NEURONTIN) 300 mg capsule  4/29/22  Yes Provider, Historical   pioglitazone (ACTOS) 15 mg tablet Take 1 Tablet by mouth daily for 30 days. 5/5/22 6/4/22 Yes Jose Mims MD   fluconazole (DIFLUCAN) 150 mg tablet 1 tab today and second tablet 72 hours later 5/5/22  Yes Jose Mims MD   glipiZIDE (GLUCOTROL) 5 mg tablet Take 1 Tablet by mouth two (2) times a day for 90 days.  5/5/22 8/3/22 Yes Jose Mims MD   semaglutide (Ozempic) 1 mg/dose (2 mg/1.5 mL) sub-q pen 1 mg by SubCUTAneous route every seven (7) days for 90 days. 5/5/22 8/3/22 Yes Leslie Brian MD   metFORMIN (GLUCOPHAGE) 1,000 mg tablet TAKE 1 TABLET BY MOUTH TWICE DAILY WITH MEALS, 90 days 5/5/22  Yes Leslie Brian MD   Comfort EZ Pen Needles 33 gauge x 5/32\" ndle Once a day 5/5/22  Yes Leslie Brian MD   Levemir FlexTouch U-100 Insuln 100 unit/mL (3 mL) inpn Inject 22 units at bedtime, 90 days 5/5/22  Yes Leslie Brian MD   glucose blood VI test strips (ASCENSIA AUTODISC VI, ONE TOUCH ULTRA TEST VI) strip Use to check glucose 3 times a day 5/5/22  Yes Leslie Brian MD   simvastatin (ZOCOR) 40 mg tablet Take 1 Tablet by mouth nightly for 90 days. 5/5/22 8/3/22 Yes Leslie Brian MD   benazepriL (LOTENSIN) 20 mg tablet Take 1 tablet by mouth once daily 2/1/22  Yes Katharina Anders NP   OneTouch Delica Plus Lancet 30 gauge misc 3 times a day, 90 days 12/22/21  Yes Leslie Brian MD   pen needle, diabetic (Comfort EZ Pen Needles) 33 gauge x 1/4\" ndle Twice a day, 90 days 9/22/21  Yes Leslie Brian MD   omega-3 acid ethyl esters (LOVAZA) 1 gram capsule Take 2 g by mouth two (2) times a day. Yes Provider, Historical   cyanocobalamin (Vitamin B-12) 100 mcg tablet Take 100 mcg by mouth daily. Yes Provider, Historical   Cetirizine (ZyrTEC) 10 mg cap Take  by mouth. Yes Provider, Historical        Allergies   Allergen Reactions    Flonase [Fluticasone Propionate] Unknown (comments)    Nasonex [Mometasone] Unknown (comments)       Review of Systems:  ROS    A comprehensive review of systems was preformed and it is negative except mentioned in HPI    Objective:     Vitals:    05/05/22 1354   BP: 114/73   Pulse: (!) 106   Temp: 96.9 °F (36.1 °C)   TempSrc: Temporal   SpO2: 97%   Weight: 193 lb 3.2 oz (87.6 kg)   Height: 5' 7\" (1.702 m)        Physical Exam:    Physical Exam  Vitals and nursing note reviewed. Constitutional:       Appearance: Normal appearance. HENT:      Head: Normocephalic and atraumatic.    Cardiovascular:      Rate and Rhythm: Normal rate and regular rhythm. Pulmonary:      Effort: Pulmonary effort is normal.      Breath sounds: Normal breath sounds. Neurological:      General: No focal deficit present. Mental Status: She is alert. diabetic foot exam:  Bilateral diabetic foot exam was performed today. Dorsalis pedis pulses 1+ bilaterally. Monofilament sensation normal bilaterally. No ulcers or skin breakdown. Labs and Imaging:  Results for Kenneth Durán (MRN [id number]) as of 3/2/2021 10:24   Ref. Range 2/23/2021 11:46   Sodium Latest Ref Range: 134 - 144 mmol/L 142   Potassium Latest Ref Range: 3.5 - 5.2 mmol/L 4.4   Chloride Latest Ref Range: 96 - 106 mmol/L 103   CO2 Latest Ref Range: 20 - 29 mmol/L 20   Glucose Latest Ref Range: 65 - 99 mg/dL 225 (H)   BUN Latest Ref Range: 8 - 27 mg/dL 22   Creatinine Latest Ref Range: 0.57 - 1.00 mg/dL 0.79   BUN/Creatinine ratio Latest Ref Range: 12 - 28  28   Calcium Latest Ref Range: 8.7 - 10.3 mg/dL 9.8   GFR est non-AA Latest Ref Range: >59 mL/min/1.73 77   GFR est AA Latest Ref Range: >59 mL/min/1.73 88   Bilirubin, total Latest Ref Range: 0.0 - 1.2 mg/dL 0.7   Protein, total Latest Ref Range: 6.0 - 8.5 g/dL 6.3   Albumin Latest Ref Range: 3.8 - 4.8 g/dL 4.2   A-G Ratio Latest Ref Range: 1.2 - 2.2  2.0   ALT Latest Ref Range: 0 - 32 IU/L 8   AST Latest Ref Range: 0 - 40 IU/L 11   Alk. phosphatase Latest Ref Range: 39 - 117 IU/L 90     Results for Kenneth Durán (MRN [id number]) as of 3/2/2021 10:24   Ref. Range 2/23/2021 11:46   Triglyceride Latest Ref Range: 0 - 149 mg/dL 154 (H)   Cholesterol, total Latest Ref Range: 100 - 199 mg/dL 142   HDL Cholesterol Latest Ref Range: >39 mg/dL 51   VLDL, calculated Latest Ref Range: 5 - 40 mg/dL 26   LDL, calculated Latest Ref Range: 0 - 99 mg/dL 65   Results for Kenneth Durán (MRN [id number]) as of 3/2/2021 10:24   Ref.  Range 2/23/2021 11:46   Creatinine, urine Latest Ref Range: Not Estab. mg/dL 61.9   Microalbumin, urine Latest Ref Range: Not Estab. ug/mL <3.0   Microalbumin/Creat. Ratio Latest Ref Range: 0 - 29 mg/g creat <5   Results for Prabhjot Echeverria (MRN [id number]) as of 3/2/2021 10:24   Ref. Range 2/23/2021 11:46   TSH Latest Ref Range: 0.450 - 4.500 uIU/mL 1.130     Last 3 Recorded Weights in this Encounter    05/05/22 1354   Weight: 193 lb 3.2 oz (87.6 kg)        No results found for: HBA1C, JJB0JVVR, CRA2FIND, UBX8SYLB     Assessment:     Patient Active Problem List   Diagnosis Code    Mixed hyperlipidemia E78.2    Type II or unspecified type diabetes mellitus with renal manifestations, uncontrolled(250.42) ([address] E11.29, E11.65    Moderate nonproliferative diabetic retinopathy of both eyes without macular edema associated with type 2 diabetes mellitus ([address] X28.3540    Cobalamin deficiency E53.8    Type 2 diabetes mellitus with hyperglycemia, with long-term current use of insulin ([address] E11.65, Z79.4    Benign essential HTN I10    Diabetic retinopathy associated with type 2 diabetes mellitus ([address] E11.319    Obesity E66.9           Plan:     type II diabetes mellitus uncontrolled   Hemoglobin A1c was 8.4% on 11-, 7.7% on 2-, 9.1% on 7-, 8.1% on 2-2-2021, 7.3% on 5-, 7.7% 9-, 7.7% on 12-, 7.4% today. Fingerstick blood glucose is 149 mg/dL in my office today. Up to date with diabetes related annual labs: yes 3-4-2022  Up to date with diabetic eye exam: yes April 2022    plan:  Discussed with patient importance of cutting back on snacking, especially cookies and other sweets. Stop Jardiance because of recurrent genital irritation. Treat with fluconazole. Start pioglitazone 15 mg daily. Continue Levemir 22 units at bedtime, Ozempic 1 mg weekly, metformin 1000 mg twice a day and glipizide 5 mg twice a day. Check blood glucose twice a day every day and I will see her back in 3 months.     mixed hyperlipidemia   LDL 54 in November 2019, 65 in February 2021, 3-4-2022: Total cholesterol 149, triglycerides 125, LDL 68. continue simvastatin 40 mg daily. essential hypertension   Blood pressure well controlled on current medications. Continue the same. No microalbuminuria last checked in 2022. Diabetic retinopathy:  S/p laser surgery right eye. Last exam was in  and she did not require any more laser surgery. Last appointment with retina specialist was in 2021 and she was discharged because she was stable. Seeing ophthalmologist every 3 months. Last appointment was in 2022. Orders Placed This Encounter    AMB POC GLUCOSE BLOOD, BY GLUCOSE MONITORING DEVICE    AMB POC HEMOGLOBIN A1C    pioglitazone (ACTOS) 15 mg tablet     Sig: Take 1 Tablet by mouth daily for 30 days. Dispense:  30 Tablet     Refill:  3     DC Jardiance    fluconazole (DIFLUCAN) 150 mg tablet     Si tab today and second tablet 72 hours later     Dispense:  2 Tablet     Refill:  0    glipiZIDE (GLUCOTROL) 5 mg tablet     Sig: Take 1 Tablet by mouth two (2) times a day for 90 days. Dispense:  180 Tablet     Refill:  2    semaglutide (Ozempic) 1 mg/dose (2 mg/1.5 mL) sub-q pen     Si mg by SubCUTAneous route every seven (7) days for 90 days.      Dispense:  3 Box     Refill:  1    metFORMIN (GLUCOPHAGE) 1,000 mg tablet     Sig: TAKE 1 TABLET BY MOUTH TWICE DAILY WITH MEALS, 90 days     Dispense:  180 Tablet     Refill:  1    Comfort EZ Pen Needles 33 gauge x 5/32\" ndle     Sig: Once a day     Dispense:  100 Each     Refill:  3    Levemir FlexTouch U-100 Insuln 100 unit/mL (3 mL) inpn     Sig: Inject 22 units at bedtime, 90 days     Dispense:  9 Adjustable Dose Pre-filled Pen Syringe     Refill:  1     Dose decrease    glucose blood VI test strips (ASCENSIA AUTODISC VI, ONE TOUCH ULTRA TEST VI) strip     Sig: Use to check glucose 3 times a day     Dispense:  300 Strip     Refill:  1    simvastatin (ZOCOR) 40 mg tablet     Sig: Take 1 Tablet by mouth nightly for 90 days.      Dispense:  90 Tablet     Refill:  3        Signed By: Adela Aguayo MD     May 5, 2022      Return to clinic 3 months

## 2022-05-05 NOTE — LETTER
5/5/2022    Patient: Lia Rendon   YOB: 1950   Date of Visit: 5/5/2022     Jayna Nicholas NP  [address]  [place] [address] [address]  [address] [facility] [address]    Dear Jayna Nicholas NP,      Thank you for referring Ms. Garland Earing to [address] [place] [address] ENDOCRINOLOGY for evaluation. My notes for this consultation are attached. If you have questions, please do not hesitate to call me. I look forward to following your patient along with you.       Sincerely,    Cordell Sanchez MD

## 2022-09-23 ENCOUNTER — VIRTUAL VISIT (OUTPATIENT)
Dept: FAMILY MEDICINE CLINIC | Age: 72
End: 2022-09-23
Payer: MEDICARE

## 2022-09-23 DIAGNOSIS — Z79.4 TYPE 2 DIABETES MELLITUS WITH HYPERGLYCEMIA, WITH LONG-TERM CURRENT USE OF INSULIN (HCC): ICD-10-CM

## 2022-09-23 DIAGNOSIS — I10 BENIGN ESSENTIAL HTN: Primary | ICD-10-CM

## 2022-09-23 DIAGNOSIS — E11.65 TYPE 2 DIABETES MELLITUS WITH HYPERGLYCEMIA, WITH LONG-TERM CURRENT USE OF INSULIN (HCC): ICD-10-CM

## 2022-09-23 DIAGNOSIS — E66.09 CLASS 1 OBESITY DUE TO EXCESS CALORIES WITH SERIOUS COMORBIDITY AND BODY MASS INDEX (BMI) OF 30.0 TO 30.9 IN ADULT: ICD-10-CM

## 2022-09-23 PROCEDURE — 1101F PT FALLS ASSESS-DOCD LE1/YR: CPT | Performed by: NURSE PRACTITIONER

## 2022-09-23 PROCEDURE — 2022F DILAT RTA XM EVC RTNOPTHY: CPT | Performed by: NURSE PRACTITIONER

## 2022-09-23 PROCEDURE — 99214 OFFICE O/P EST MOD 30 MIN: CPT | Performed by: NURSE PRACTITIONER

## 2022-09-23 PROCEDURE — 3051F HG A1C>EQUAL 7.0%<8.0%: CPT | Performed by: NURSE PRACTITIONER

## 2022-09-23 PROCEDURE — G8399 PT W/DXA RESULTS DOCUMENT: HCPCS | Performed by: NURSE PRACTITIONER

## 2022-09-23 PROCEDURE — 1090F PRES/ABSN URINE INCON ASSESS: CPT | Performed by: NURSE PRACTITIONER

## 2022-09-23 PROCEDURE — 3017F COLORECTAL CA SCREEN DOC REV: CPT | Performed by: NURSE PRACTITIONER

## 2022-09-23 PROCEDURE — G8432 DEP SCR NOT DOC, RNG: HCPCS | Performed by: NURSE PRACTITIONER

## 2022-09-23 PROCEDURE — G8427 DOCREV CUR MEDS BY ELIG CLIN: HCPCS | Performed by: NURSE PRACTITIONER

## 2022-09-23 PROCEDURE — G9899 SCRN MAM PERF RSLTS DOC: HCPCS | Performed by: NURSE PRACTITIONER

## 2022-09-23 PROCEDURE — G8417 CALC BMI ABV UP PARAM F/U: HCPCS | Performed by: NURSE PRACTITIONER

## 2022-09-23 PROCEDURE — G8536 NO DOC ELDER MAL SCRN: HCPCS | Performed by: NURSE PRACTITIONER

## 2022-09-23 PROCEDURE — G8756 NO BP MEASURE DOC: HCPCS | Performed by: NURSE PRACTITIONER

## 2022-09-23 PROCEDURE — 1123F ACP DISCUSS/DSCN MKR DOCD: CPT | Performed by: NURSE PRACTITIONER

## 2022-09-23 RX ORDER — SIMVASTATIN 40 MG/1
TABLET, FILM COATED ORAL
COMMUNITY

## 2022-09-23 RX ORDER — SEMAGLUTIDE 1.34 MG/ML
INJECTION, SOLUTION SUBCUTANEOUS
COMMUNITY
Start: 2022-08-29 | End: 2022-09-26 | Stop reason: SDUPTHER

## 2022-09-23 NOTE — PROGRESS NOTES
Consent: Leroy Saez, who was seen by synchronous (real-time) audio-video technology, and/or her healthcare decision maker, is aware that this patient-initiated, Telehealth encounter on 9/23/2022 is a billable service, with coverage as determined by her insurance carrier. She is aware that she may receive a bill and has provided verbal consent to proceed: YES-Consent obtained within past 12 months        712  Subjective:   Leroy Saez is a 71 y.o. female who was seen for Follow-up (HTN, 6 month check up, DM)  Patient presents for management of hypertension. Her endocrinologist has relocated. Management of HTN-  Current meds: Benazepril 20 mg daily  Home BP readings: 116/63 this am, usually 110s/60s  Denies lightheadedness, dizziness, headaches, chest pain, palpitations, and lower extremity edema. Management of T2DM-  Current meds: Metformin 1000 mg twice daily and Levemir 22 units nightly and Ozempic 1mg weekly  She was started on Jardiance which caused yeast infections and was discontinued. She was then started on Actos which caused a 10 pound weight gain. She stopped Actos two weeks ago and has lost 3 pounds.    Compliant with medication: Yes  Last A1c:   Lab Results   Component Value Date/Time    Hemoglobin A1c (POC) 7.4 05/05/2022 02:00 PM      Home glucose monitoring range: 126 this am, fasting typically <130, postprandial <150  Hypoglycemia: denies  Increased thirst, appetite, and urination: denies  Vision changes, last eye exam: denies, 8/31/2022, note in chart  Checks feet regularly, numbness and injury: yes, reports mild numbness at bedtime  Last foot exam: 3/2022  Compliant with statin: SImvastatin  Compliant with ACEI: Benazepril  Last microalbumin:   Lab Results   Component Value Date/Time    Microalb/Creat ratio (ug/mg creat.) <3 03/04/2022 03:26 PM     Pneumovax: complete  Compliant with diet: yes with occasional sweet treat  Complaint with exercise: minimal  Personal h/o CV disease including MI, stroke, and PVD: denies        Prior to Admission medications    Medication Sig Start Date End Date Taking? Authorizing Provider   simvastatin (ZOCOR) 40 mg tablet Take  by mouth nightly. Yes Provider, Historical   benazepriL (LOTENSIN) 20 mg tablet Take 1 tablet by mouth once daily 5/9/22  Yes Giovanny Mendez NP   metFORMIN (GLUCOPHAGE) 1,000 mg tablet TAKE 1 TABLET BY MOUTH TWICE DAILY WITH MEALS, 90 days 5/5/22  Yes Baylee Macias MD   Comfort EZ Pen Needles 33 gauge x 5/32\" ndle Once a day 5/5/22  Yes Baylee Macias MD   Levemir FlexTouch U-100 Insuln 100 unit/mL (3 mL) inpn Inject 22 units at bedtime, 90 days 5/5/22  Yes Baylee Macias MD   glucose blood VI test strips (ASCENSIA AUTODISC VI, ONE TOUCH ULTRA TEST VI) strip Use to check glucose 3 times a day 5/5/22  Yes Baylee Macias MD   OneTouch Delica Plus Lancet 30 gauge misc 3 times a day, 90 days 12/22/21  Yes Baylee Macias MD   omega-3 acid ethyl esters (LOVAZA) 1 gram capsule Take 2 g by mouth two (2) times a day. Yes Provider, Historical   cyanocobalamin (VITAMIN B12) 100 mcg tablet Take 100 mcg by mouth daily. Yes Provider, Historical   Cetirizine (ZyrTEC) 10 mg cap Take  by mouth.    Yes Provider, Historical   Ozempic 1 mg/dose (4 mg/3 mL) pnij INJECT 1 MG SUBCUTANEOUSLY ONCE A WEEK DISCONTINUE OZEMPIC 0.5MG WEEKLY 8/29/22   Provider, Historical   gabapentin (NEURONTIN) 300 mg capsule  4/29/22 9/23/22  Provider, Historical   fluconazole (DIFLUCAN) 150 mg tablet 1 tab today and second tablet 72 hours later  Patient not taking: Reported on 9/23/2022 5/5/22 9/23/22  Baylee Macias MD   pen needle, diabetic (Comfort EZ Pen Needles) 33 gauge x 1/4\" ndle Twice a day, 90 days 9/22/21   Baylee Macias MD     Allergies   Allergen Reactions    Flonase [Fluticasone Propionate] Unknown (comments)    Nasonex [Mometasone] Unknown (comments)     Patient Active Problem List    Diagnosis    Obesity    Diabetic retinopathy associated with type 2 diabetes mellitus ([facility]    Type 2 diabetes mellitus with hyperglycemia, with long-term current use of insulin ([facility])    Benign essential HTN    Cobalamin deficiency    Moderate nonproliferative diabetic retinopathy of both eyes without macular edema associated with type 2 diabetes mellitus ([facility]    Mixed hyperlipidemia    Type II or unspecified type diabetes mellitus with renal manifestations, uncontrolled(250.42) ([facility])           Objective:   Vital Signs: (As obtained by patient/caregiver at home)  There were no vitals taken for this visit. [INSTRUCTIONS:  \"[x]\" Indicates a positive item  \"[]\" Indicates a negative item  -- DELETE ALL ITEMS NOT EXAMINED]    Constitutional: [x] Appears well-developed and well-nourished [x] No apparent distress      [] Abnormal -     Mental status: [x] Alert and awake  [x] Oriented to person/place/time [x] Able to follow commands    [] Abnormal -     Eyes:   EOM    [x]  Normal    [] Abnormal -   Sclera  [x]  Normal    [] Abnormal -          Discharge [x]  None visible   [] Abnormal -     HENT: [x] Normocephalic, atraumatic  [] Abnormal -   [x] Mouth/Throat: Mucous membranes are moist    External Ears [x] Normal  [] Abnormal -    Neck: [x] No visualized mass [] Abnormal -     Pulmonary/Chest: [x] Respiratory effort normal   [x] No visualized signs of difficulty breathing or respiratory distress        [] Abnormal -        Neurological:        [x] No Facial Asymmetry (Cranial nerve 7 motor function) (limited exam due to video visit)          [x] No gaze palsy        [] Abnormal -          Skin:        [x] No significant exanthematous lesions or discoloration noted on facial skin         [] Abnormal -            Psychiatric:       [x] Normal Affect [] Abnormal -        [x] No Hallucinations    Other pertinent observable physical exam findings:-              Assessment & Plan:   Diagnoses and all orders for this visit:    1.  Benign essential HTN  BP is below goal on reported home readings. No medication changes. Continue to check home BP readings regularly and call if consistently greater then 140/90 on either number. 2. Type 2 diabetes mellitus with hyperglycemia, with long-term current use of insulin ([address]  Reported fasting and postprandial glucose readings are below expected. She will schedule a nurse visit for next week to have her A1c rechecked before adjusting medication. If A1c remains elevated, we discussed increasing Ozempic to help with glucose and weight loss. Continue to limit sugars and carbs. Increase regular exercise. We also discussed low-dose gabapentin for nighttime neuropathy which she declines at this time. 3. Class 1 obesity due to excess calories with serious comorbidity and body mass index (BMI) of 30.0 to 30.9 in adult  Weight is decreasing since discontinuing Actos. Continue to eat a healthy diet and increase regular exercise. Follow-up and Dispositions    Return for 4- months pending A1c results. We discussed the expected course, resolution and complications of the diagnosis(es) in detail. Medication risks, benefits, costs, interactions, and alternatives were discussed as indicated. I advised her to contact the office if her condition worsens, changes or fails to improve as anticipated. She expressed understanding with the diagnosis(es) and plan. Maegan Okeefe is a 71 y.o. female being evaluated by a video visit encounter for concerns as above. A caregiver was present when appropriate. Due to this being a TeleHealth encounter (During VJSSJ-33 public health emergency), evaluation of the following organ systems was limited: Vitals/Constitutional/EENT/Resp/CV/GI//MS/Neuro/Skin/Heme-Lymph-Imm.   Pursuant to the emergency declaration under the [facility] waiver authority and the [employer] Act, this Virtual  Visit was conducted, with patient's (and/or legal guardian's) consent, to reduce the patient's risk of exposure to COVID-19 and provide necessary medical care. Services were provided through a video synchronous discussion virtually to substitute for in-person clinic visit. Patient and provider were located at their individual homes.         Mago [place] NP

## 2022-09-26 ENCOUNTER — CLINICAL SUPPORT (OUTPATIENT)
Dept: FAMILY MEDICINE CLINIC | Age: 72
End: 2022-09-26
Payer: MEDICARE

## 2022-09-26 DIAGNOSIS — Z79.4 TYPE 2 DIABETES MELLITUS WITH HYPERGLYCEMIA, WITH LONG-TERM CURRENT USE OF INSULIN (HCC): Primary | ICD-10-CM

## 2022-09-26 DIAGNOSIS — E11.65 TYPE 2 DIABETES MELLITUS WITH HYPERGLYCEMIA, WITH LONG-TERM CURRENT USE OF INSULIN (HCC): Primary | ICD-10-CM

## 2022-09-26 DIAGNOSIS — E11.65 TYPE 2 DIABETES MELLITUS WITH HYPERGLYCEMIA, WITH LONG-TERM CURRENT USE OF INSULIN (HCC): ICD-10-CM

## 2022-09-26 DIAGNOSIS — Z79.4 TYPE 2 DIABETES MELLITUS WITH HYPERGLYCEMIA, WITH LONG-TERM CURRENT USE OF INSULIN (HCC): ICD-10-CM

## 2022-09-26 LAB — HBA1C MFR BLD HPLC: 7.9 %

## 2022-09-26 PROCEDURE — 83036 HEMOGLOBIN GLYCOSYLATED A1C: CPT | Performed by: FAMILY MEDICINE

## 2022-09-26 PROCEDURE — 3051F HG A1C>EQUAL 7.0%<8.0%: CPT | Performed by: FAMILY MEDICINE

## 2022-09-26 NOTE — TELEPHONE ENCOUNTER
Reason for call: Pt wants to refill her Levmir and Ozempic.      [address]  [phone]    Is this a new problem: yes     Date of last appointment:  9/23/2022     Can we respond via [employer]: no    Best call back number: [phone]

## 2022-09-27 RX ORDER — INSULIN DETEMIR 100 [IU]/ML
INJECTION, SOLUTION SUBCUTANEOUS
Qty: 9 ADJUSTABLE DOSE PRE-FILLED PEN SYRINGE | Refills: 1 | Status: SHIPPED | OUTPATIENT
Start: 2022-09-27

## 2022-09-27 RX ORDER — SEMAGLUTIDE 1.34 MG/ML
1 INJECTION, SOLUTION SUBCUTANEOUS
Qty: 3 EACH | Refills: 0 | Status: SHIPPED | OUTPATIENT
Start: 2022-09-27

## 2022-10-31 RX ORDER — BENAZEPRIL HYDROCHLORIDE 20 MG/1
TABLET ORAL
Qty: 90 TABLET | Refills: 0 | Status: SHIPPED | OUTPATIENT
Start: 2022-10-31

## 2022-11-22 RX ORDER — SEMAGLUTIDE 1.34 MG/ML
INJECTION, SOLUTION SUBCUTANEOUS
Qty: 3 EACH | Refills: 0 | Status: SHIPPED | OUTPATIENT
Start: 2022-11-22

## 2022-12-06 DIAGNOSIS — E11.65 TYPE 2 DIABETES MELLITUS WITH HYPERGLYCEMIA, WITH LONG-TERM CURRENT USE OF INSULIN (HCC): ICD-10-CM

## 2022-12-06 DIAGNOSIS — Z79.4 TYPE 2 DIABETES MELLITUS WITH HYPERGLYCEMIA, WITH LONG-TERM CURRENT USE OF INSULIN (HCC): ICD-10-CM

## 2022-12-06 RX ORDER — METFORMIN HYDROCHLORIDE 1000 MG/1
TABLET ORAL
Qty: 180 TABLET | Refills: 1 | Status: SHIPPED | OUTPATIENT
Start: 2022-12-06

## 2022-12-27 RX ORDER — SEMAGLUTIDE 1.34 MG/ML
INJECTION, SOLUTION SUBCUTANEOUS
Qty: 4 EACH | Refills: 0 | Status: SHIPPED | OUTPATIENT
Start: 2022-12-27

## 2023-02-06 DIAGNOSIS — Z79.4 TYPE 2 DIABETES MELLITUS WITH HYPERGLYCEMIA, WITH LONG-TERM CURRENT USE OF INSULIN (HCC): ICD-10-CM

## 2023-02-06 DIAGNOSIS — E11.65 TYPE 2 DIABETES MELLITUS WITH HYPERGLYCEMIA, WITH LONG-TERM CURRENT USE OF INSULIN (HCC): ICD-10-CM

## 2023-02-06 RX ORDER — INSULIN DETEMIR 100 [IU]/ML
INJECTION, SOLUTION SUBCUTANEOUS
Qty: 15 ML | Refills: 0 | Status: SHIPPED | OUTPATIENT
Start: 2023-02-06

## 2023-02-20 RX ORDER — SEMAGLUTIDE 1.34 MG/ML
INJECTION, SOLUTION SUBCUTANEOUS
Qty: 6 EACH | Refills: 1 | Status: SHIPPED | OUTPATIENT
Start: 2023-02-20

## 2023-03-14 ENCOUNTER — OFFICE VISIT (OUTPATIENT)
Dept: FAMILY MEDICINE CLINIC | Age: 73
End: 2023-03-14
Payer: MEDICARE

## 2023-03-14 VITALS
WEIGHT: 205.25 LBS | BODY MASS INDEX: 32.21 KG/M2 | HEIGHT: 67 IN | HEART RATE: 100 BPM | TEMPERATURE: 97.6 F | SYSTOLIC BLOOD PRESSURE: 132 MMHG | OXYGEN SATURATION: 98 % | RESPIRATION RATE: 16 BRPM | DIASTOLIC BLOOD PRESSURE: 80 MMHG

## 2023-03-14 DIAGNOSIS — I10 BENIGN ESSENTIAL HTN: Primary | ICD-10-CM

## 2023-03-14 DIAGNOSIS — E11.9 ENCOUNTER FOR DIABETIC FOOT EXAM (HCC): ICD-10-CM

## 2023-03-14 DIAGNOSIS — E78.2 MIXED HYPERLIPIDEMIA: ICD-10-CM

## 2023-03-14 DIAGNOSIS — E53.8 DISORDER OF VITAMIN B12: ICD-10-CM

## 2023-03-14 DIAGNOSIS — E66.09 CLASS 1 OBESITY DUE TO EXCESS CALORIES WITH SERIOUS COMORBIDITY AND BODY MASS INDEX (BMI) OF 32.0 TO 32.9 IN ADULT: ICD-10-CM

## 2023-03-14 DIAGNOSIS — E11.65 TYPE 2 DIABETES MELLITUS WITH HYPERGLYCEMIA, WITH LONG-TERM CURRENT USE OF INSULIN (HCC): ICD-10-CM

## 2023-03-14 DIAGNOSIS — Z79.4 TYPE 2 DIABETES MELLITUS WITH HYPERGLYCEMIA, WITH LONG-TERM CURRENT USE OF INSULIN (HCC): ICD-10-CM

## 2023-03-14 PROCEDURE — G8432 DEP SCR NOT DOC, RNG: HCPCS | Performed by: NURSE PRACTITIONER

## 2023-03-14 PROCEDURE — 3046F HEMOGLOBIN A1C LEVEL >9.0%: CPT | Performed by: NURSE PRACTITIONER

## 2023-03-14 PROCEDURE — 3075F SYST BP GE 130 - 139MM HG: CPT | Performed by: NURSE PRACTITIONER

## 2023-03-14 PROCEDURE — 1101F PT FALLS ASSESS-DOCD LE1/YR: CPT | Performed by: NURSE PRACTITIONER

## 2023-03-14 PROCEDURE — 1123F ACP DISCUSS/DSCN MKR DOCD: CPT | Performed by: NURSE PRACTITIONER

## 2023-03-14 PROCEDURE — G8536 NO DOC ELDER MAL SCRN: HCPCS | Performed by: NURSE PRACTITIONER

## 2023-03-14 PROCEDURE — 2022F DILAT RTA XM EVC RTNOPTHY: CPT | Performed by: NURSE PRACTITIONER

## 2023-03-14 PROCEDURE — 3017F COLORECTAL CA SCREEN DOC REV: CPT | Performed by: NURSE PRACTITIONER

## 2023-03-14 PROCEDURE — G8399 PT W/DXA RESULTS DOCUMENT: HCPCS | Performed by: NURSE PRACTITIONER

## 2023-03-14 PROCEDURE — G9899 SCRN MAM PERF RSLTS DOC: HCPCS | Performed by: NURSE PRACTITIONER

## 2023-03-14 PROCEDURE — 1090F PRES/ABSN URINE INCON ASSESS: CPT | Performed by: NURSE PRACTITIONER

## 2023-03-14 PROCEDURE — 99214 OFFICE O/P EST MOD 30 MIN: CPT | Performed by: NURSE PRACTITIONER

## 2023-03-14 PROCEDURE — G8427 DOCREV CUR MEDS BY ELIG CLIN: HCPCS | Performed by: NURSE PRACTITIONER

## 2023-03-14 PROCEDURE — 3079F DIAST BP 80-89 MM HG: CPT | Performed by: NURSE PRACTITIONER

## 2023-03-14 PROCEDURE — G8417 CALC BMI ABV UP PARAM F/U: HCPCS | Performed by: NURSE PRACTITIONER

## 2023-03-14 RX ORDER — CELECOXIB 100 MG/1
100 CAPSULE ORAL 2 TIMES DAILY
COMMUNITY
Start: 2023-02-23

## 2023-03-14 RX ORDER — GLIPIZIDE 5 MG/1
TABLET ORAL 2 TIMES DAILY
COMMUNITY
End: 2023-03-14 | Stop reason: DRUGHIGH

## 2023-03-14 RX ORDER — SEMAGLUTIDE 2.68 MG/ML
2 INJECTION, SOLUTION SUBCUTANEOUS
Qty: 3 ML | Refills: 0 | Status: SHIPPED | OUTPATIENT
Start: 2023-03-14

## 2023-03-14 NOTE — PROGRESS NOTES
Subjective  Chief Complaint   Patient presents with    Follow-up     Diabetes     HPI:  Quincy Maldonado is a 68 y.o. female. Patient presents for management of HTN, HLD, and T2DM. Statin daily in the evening. Moderate intake of fat/cholesterol. Celebrex prescribed by ortho for lumbar OA. Management of HTN-  Current meds: Benazepril 20mg  Home BP readings: 120s/70s  Denies lightheadedness, dizziness, headaches, chest pain, palpitations, and lower extremity edema.     Management of T2DM-  Current meds: Glipizide 5mg BID, Levemir 22 units daily, Metformin 1000mg BID, Ozempic 1mg weekly  Compliant with medication: yes  Last A1c:   Lab Results   Component Value Date/Time    Hemoglobin A1c (POC) 7.9 09/26/2022 02:30 PM      Home glucose monitoring range: fasting 170s  Hypoglycemia: denies  Increased thirst, appetite, and urination: denies  Vision changes, last eye exam: denies, Dr. Pineda [place] feet regularly, numbness and injury: yes, denies  Last foot exam: due  Compliant with statin: Simvastatin  Compliant with ACEI: Benazepril  Last microalbumin:   Lab Results   Component Value Date/Time    Microalb/Creat ratio (ug/mg creat.) <3 03/04/2022 03:26 PM     Pneumovax: complete  Compliant with diet: trying  Complaint with exercise: no  Personal h/o CV disease including MI, stroke, and PVD: denies      Past Medical History:   Diagnosis Date    Essential hypertension, malignant     Frozen shoulder     History of mammogram 12/15/2020    Mixed hyperlipidemia     Type II or unspecified type diabetes mellitus with renal manifestations, uncontrolled(250.42) ([facility] [address]      Family History   Problem Relation Age of Onset    Heart Disease Mother     Heart Failure Mother     Cancer Father         Lung    Atrial Fibrillation Father     Diabetes Sister     Diabetes Maternal Aunt     Hypertension Paternal Aunt      Social History     Socioeconomic History    Marital status:      Spouse name: Not on file    Number of children: Not on file    Years of education: Not on file    Highest education level: Not on file   Occupational History    Not on file   Tobacco Use    Smoking status: Never    Smokeless tobacco: Never   Vaping Use    Vaping Use: Never used   Substance and Sexual Activity    Alcohol use: Never    Drug use: Never    Sexual activity: Not on file   Other Topics Concern    Not on file   Social History Narrative    Not on file     Social Determinants of Health     Financial Resource Strain: Low Risk     Difficulty of Paying Living Expenses: Not hard at all   Food Insecurity: No Food Insecurity    Worried About Running Out of Food in the Last Year: Never true    Ran Out of Food in the Last Year: Never true   Transportation Needs: Not on file   Physical Activity: Not on file   Stress: Not on file   Social Connections: Not on file   Intimate Partner Violence: Not on file   Housing Stability: Not on file     Current Outpatient Medications on File Prior to Visit   Medication Sig Dispense Refill    celecoxib (CELEBREX) 100 mg capsule Take 100 mg by mouth two (2) times a day. Levemir FlexTouch U-100 Insuln 100 unit/mL (3 mL) inpn INJECT 22 UNITS SUBCUTANEOUSLY AT BEDTIME FOR 90 DAYS 15 mL 0    benazepriL (LOTENSIN) 20 mg tablet Take 1 tablet by mouth once daily 90 Tablet 0    metFORMIN (GLUCOPHAGE) 1,000 mg tablet TAKE 1 TABLET BY MOUTH TWICE DAILY WITH MEALS, 90 days 180 Tablet 1    simvastatin (ZOCOR) 40 mg tablet Take  by mouth nightly. Comfort EZ Pen Needles 33 gauge x 5/32\" ndle Once a day 100 Each 3    glucose blood VI test strips (ASCENSIA AUTODISC VI, ONE TOUCH ULTRA TEST VI) strip Use to check glucose 3 times a day 300 Strip 1    OneTouch Delica Plus Lancet 30 gauge misc 3 times a day, 90 days 300 Lancet 1    pen needle, diabetic (Comfort EZ Pen Needles) 33 gauge x 1/4\" ndle Twice a day, 90 days 200 Each 1    omega-3 acid ethyl esters (LOVAZA) 1 gram capsule Take 2 g by mouth two (2) times a day.       Cetirizine (ZyrTEC) 10 mg cap Take  by mouth. [DISCONTINUED] glipiZIDE (GLUCOTROL) 5 mg tablet Take  by mouth two (2) times a day. [DISCONTINUED] Ozempic 1 mg/dose (4 mg/3 mL) pnij INJECT 1 MG SUBCUTANEOUSLY ONCE A WEEK 6 Each 1    cyanocobalamin (VITAMIN B12) 100 mcg tablet Take 100 mcg by mouth daily. No current facility-administered medications on file prior to visit. Allergies   Allergen Reactions    Flonase [Fluticasone Propionate] Unknown (comments)    Jardiance [Empagliflozin] Other (comments)     Yeast infections    Nasonex [Mometasone] Unknown (comments)         Objective  Visit Vitals  /80   Pulse 100   Temp 97.6 °F (36.4 °C) (Temporal)   Resp 16   Ht 5' 7\" (1.702 m)   Wt 205 lb 4 oz (93.1 kg)   SpO2 98%   BMI 32.15 kg/m²       Physical Exam  Vitals and nursing note reviewed. Constitutional:       General: She is not in acute distress. Appearance: Normal appearance. She is obese. HENT:      Head: Normocephalic. Eyes:      Extraocular Movements: Extraocular movements intact. Neck:      Thyroid: No thyroid mass, thyromegaly or thyroid tenderness. Cardiovascular:      Rate and Rhythm: Normal rate and regular rhythm. Pulses:           Dorsalis pedis pulses are 2+ on the right side and 2+ on the left side. Posterior tibial pulses are 2+ on the right side and 2+ on the left side. Heart sounds: Normal heart sounds. Pulmonary:      Effort: Pulmonary effort is normal.      Breath sounds: Normal breath sounds. Musculoskeletal:         General: Normal range of motion. Cervical back: Neck supple. Right lower leg: No edema. Left lower leg: No edema. Right foot: Normal range of motion. Left foot: Normal range of motion. Feet:      Right foot:      Protective Sensation: 10 sites tested. 10 sites sensed. Skin integrity: Dry skin present.       Toenail Condition: Right toenails are normal.      Left foot:      Protective Sensation: 10 sites tested. 10 sites sensed. Skin integrity: Dry skin present. Toenail Condition: Left toenails are normal.   Lymphadenopathy:      Cervical: No cervical adenopathy. Upper Body:      Right upper body: No supraclavicular adenopathy. Left upper body: No supraclavicular adenopathy. Skin:     General: Skin is warm and dry. Neurological:      Mental Status: She is alert and oriented to person, place, and time. Psychiatric:         Mood and Affect: Mood normal.         Behavior: Behavior normal.        Assessment & Plan      ICD-10-CM ICD-9-CM    1. Benign essential HTN  J37 993.9 METABOLIC PANEL, COMPREHENSIVE      2. Mixed hyperlipidemia  E78.2 272.2 CBC WITH AUTOMATED DIFF      LIPID PANEL      3. Type 2 diabetes mellitus with hyperglycemia, with long-term current use of insulin ([facility])  E11.65 250.00 MICROALBUMIN, UR, RAND W/ MICROALB/CREAT RATIO    Z79.4 790.29 HEMOGLOBIN A1C WITH EAG     V58.67  DIABETES FOOT EXAM      semaglutide (Ozempic) 2 mg/dose (8 mg/3 mL) pnij      4. Disorder of vitamin B12  E53.8 266.2 VITAMIN B12      5. Encounter for diabetic foot exam ([facility] [address]  E11.9 250.00  DIABETES FOOT EXAM      6. Class 1 obesity due to excess calories with serious comorbidity and body mass index (BMI) of 32.0 to 32.9 in adult  E66.09 278.00     Z68.32 V85.32         Diagnoses and all orders for this visit:    1. Benign essential HTN  BP is below goal in the office today and on reported home readings. Continue current medication and dose. Continue to check home BP readings regularly and call if consistently greater than 140/90 on either number.  -     METABOLIC PANEL, COMPREHENSIVE    2. Mixed hyperlipidemia  Checking annual labs. Continue simvastatin daily in the evening. Continue to work to decrease cholesterol intake and increase regular exercise. -     CBC WITH AUTOMATED DIFF  -     LIPID PANEL    3.  Type 2 diabetes mellitus with hyperglycemia, with long-term current use of insulin ([facility]  Reported fasting glucose levels are above goal.  Checking A1c today and getting up-to-date on diabetic measures as noted. We will request most recent eye exam for review. Discussed the negative impact of glipizide and Levemir are having for weight gain. Stop glipizide and increase dose of Ozempic as ordered. Call with glucose readings before the next refill. Ideally insulin will be discontinued over the next several months. Reinforced the importance of weight loss, regular exercise, and dietary compliance to improve glucose levels and prevent diabetic complication.  -     MICROALBUMIN, UR, RAND W/ MICROALB/CREAT RATIO  -     HEMOGLOBIN A1C WITH EAG  -      DIABETES FOOT EXAM  -     semaglutide (Ozempic) 2 mg/dose (8 mg/3 mL) pnij; 2 mg by SubCUTAneous route every seven (7) days. 4. Disorder of vitamin B12  Potential malabsorption due to metformin daily. -     VITAMIN B12    5. Encounter for diabetic foot exam ([facility]  Check feet daily for injury, calluses, and dry skin. Keep nails clean and dry. Apply lotion to feet nightly to improve dry skin and soften calluses. Use caution when receiving pedicures. -      DIABETES FOOT EXAM    6. Class 1 obesity due to excess calories with serious comorbidity and body mass index (BMI) of 32.0 to 32.9 in adult  Weight is up approximately 13 pounds since previous visit. Stop glipizide and increase Ozempic as ordered. Also recommend regular exercise, portion control, and healthy eating. Aspects of this note may have been generated using voice recognition software. Despite editing, there may be some syntax errors. Follow-up and Dispositions    Return in about 4 months (around 7/14/2023) for MCW, nonfasting, follow up, chronic conditions/meds. I have discussed the diagnosis with the patient and the intended plan as seen in the above orders. The patient has received an after-visit summary and questions were answered concerning future plans.   I have discussed medication side effects and warnings with the patient as well.     Willy Sever, NP

## 2023-03-14 NOTE — PROGRESS NOTES
Chief Complaint   Patient presents with    Follow-up     Diabetes    1. \"Have you been to the ER, urgent care clinic since your last visit? Hospitalized since your last visit? \" No    2. \"Have you seen or consulted any other health care providers outside of the [address] [place] [address] since your last visit? \" No     3. For patients aged 39-70: Has the patient had a colonoscopy / FIT/ Cologuard? Yes - no Care Gap present      If the patient is female:    4. For patients aged 41-77: Has the patient had a mammogram within the past 2 years? Yes - no Care Gap present      5. For patients aged 21-65: Has the patient had a pap smear?  Yes - no Care Gap present Visit Vitals  /80   Pulse 100   Temp 97.6 °F (36.4 °C) (Temporal)   Resp 16   Ht 5' 7\" (1.702 m)   Wt 205 lb 4 oz (93.1 kg)   SpO2 98%   BMI 32.15 kg/m²      3 most recent PHQ Screens 3/14/2023   PHQ Not Done -   Little interest or pleasure in doing things Not at all   Feeling down, depressed, irritable, or hopeless Not at all   Total Score PHQ 2 0

## 2023-03-16 LAB
ALBUMIN SERPL-MCNC: 4.3 G/DL (ref 3.7–4.7)
ALBUMIN/CREAT UR: 6 MG/G CREAT (ref 0–29)
ALBUMIN/GLOB SERPL: 2 {RATIO} (ref 1.2–2.2)
ALP SERPL-CCNC: 98 IU/L (ref 44–121)
ALT SERPL-CCNC: 8 IU/L (ref 0–32)
AST SERPL-CCNC: 9 IU/L (ref 0–40)
BASOPHILS # BLD AUTO: 0 X10E3/UL (ref 0–0.2)
BASOPHILS NFR BLD AUTO: 1 %
BILIRUB SERPL-MCNC: 0.7 MG/DL (ref 0–1.2)
BUN SERPL-MCNC: 19 MG/DL (ref 8–27)
BUN/CREAT SERPL: 25 (ref 12–28)
CALCIUM SERPL-MCNC: 10 MG/DL (ref 8.7–10.3)
CHLORIDE SERPL-SCNC: 103 MMOL/L (ref 96–106)
CHOLEST SERPL-MCNC: 158 MG/DL (ref 100–199)
CO2 SERPL-SCNC: 24 MMOL/L (ref 20–29)
CREAT SERPL-MCNC: 0.76 MG/DL (ref 0.57–1)
CREAT UR-MCNC: 138 MG/DL
EGFRCR SERPLBLD CKD-EPI 2021: 83 ML/MIN/1.73
EOSINOPHIL # BLD AUTO: 0.2 X10E3/UL (ref 0–0.4)
EOSINOPHIL NFR BLD AUTO: 3 %
ERYTHROCYTE [DISTWIDTH] IN BLOOD BY AUTOMATED COUNT: 12.6 % (ref 11.7–15.4)
EST. AVERAGE GLUCOSE BLD GHB EST-MCNC: 240 MG/DL
GLOBULIN SER CALC-MCNC: 2.1 G/DL (ref 1.5–4.5)
GLUCOSE SERPL-MCNC: 176 MG/DL (ref 70–99)
HBA1C MFR BLD: 10 % (ref 4.8–5.6)
HCT VFR BLD AUTO: 45.2 % (ref 34–46.6)
HDLC SERPL-MCNC: 55 MG/DL
HGB BLD-MCNC: 14.9 G/DL (ref 11.1–15.9)
IMM GRANULOCYTES # BLD AUTO: 0 X10E3/UL (ref 0–0.1)
IMM GRANULOCYTES NFR BLD AUTO: 0 %
LDLC SERPL CALC-MCNC: 78 MG/DL (ref 0–99)
LYMPHOCYTES # BLD AUTO: 2.7 X10E3/UL (ref 0.7–3.1)
LYMPHOCYTES NFR BLD AUTO: 36 %
MCH RBC QN AUTO: 30.6 PG (ref 26.6–33)
MCHC RBC AUTO-ENTMCNC: 33 G/DL (ref 31.5–35.7)
MCV RBC AUTO: 93 FL (ref 79–97)
MICROALBUMIN UR-MCNC: 7.8 UG/ML
MONOCYTES # BLD AUTO: 0.7 X10E3/UL (ref 0.1–0.9)
MONOCYTES NFR BLD AUTO: 10 %
NEUTROPHILS # BLD AUTO: 3.8 X10E3/UL (ref 1.4–7)
NEUTROPHILS NFR BLD AUTO: 50 %
PLATELET # BLD AUTO: 327 X10E3/UL (ref 150–450)
POTASSIUM SERPL-SCNC: 4.5 MMOL/L (ref 3.5–5.2)
PROT SERPL-MCNC: 6.4 G/DL (ref 6–8.5)
RBC # BLD AUTO: 4.87 X10E6/UL (ref 3.77–5.28)
SODIUM SERPL-SCNC: 141 MMOL/L (ref 134–144)
TRIGL SERPL-MCNC: 143 MG/DL (ref 0–149)
VIT B12 SERPL-MCNC: 840 PG/ML (ref 232–1245)
VLDLC SERPL CALC-MCNC: 25 MG/DL (ref 5–40)
WBC # BLD AUTO: 7.4 X10E3/UL (ref 3.4–10.8)

## 2023-04-22 DIAGNOSIS — E11.65 TYPE 2 DIABETES MELLITUS WITH HYPERGLYCEMIA, WITH LONG-TERM CURRENT USE OF INSULIN (HCC): ICD-10-CM

## 2023-04-22 DIAGNOSIS — Z79.4 TYPE 2 DIABETES MELLITUS WITH HYPERGLYCEMIA, WITH LONG-TERM CURRENT USE OF INSULIN (HCC): ICD-10-CM

## 2023-04-23 DIAGNOSIS — Z79.4 TYPE 2 DIABETES MELLITUS WITH HYPERGLYCEMIA, WITH LONG-TERM CURRENT USE OF INSULIN (HCC): ICD-10-CM

## 2023-04-23 DIAGNOSIS — E11.65 TYPE 2 DIABETES MELLITUS WITH HYPERGLYCEMIA, WITH LONG-TERM CURRENT USE OF INSULIN (HCC): ICD-10-CM

## 2023-04-23 RX ORDER — SEMAGLUTIDE 2.68 MG/ML
INJECTION, SOLUTION SUBCUTANEOUS
Qty: 3 ML | Refills: 0 | Status: SHIPPED | OUTPATIENT
Start: 2023-04-23

## 2023-04-25 RX ORDER — INSULIN DETEMIR 100 [IU]/ML
INJECTION, SOLUTION SUBCUTANEOUS
Qty: 15 ML | Refills: 0 | Status: SHIPPED | OUTPATIENT
Start: 2023-04-25 | End: 2023-04-26 | Stop reason: DRUGHIGH

## 2023-04-26 ENCOUNTER — TELEPHONE (OUTPATIENT)
Dept: FAMILY MEDICINE CLINIC | Age: 73
End: 2023-04-26

## 2023-04-26 NOTE — TELEPHONE ENCOUNTER
Patient called in regards to sitting at [address] since 1100 am due to the pharmaceutical company updating the name to [address]  Made patient aware that JAISON Ramos has been busy with patients all day to which the patient replied that she will continue to sit at [facility] until it is filled

## 2023-04-26 NOTE — TELEPHONE ENCOUNTER
Unable to fill Levemir rx because pharmaceutical company has changed the name from Flex Touch to Flex 10 so they will need new rx sent over before they can fill and patient is out of meds.

## 2023-05-02 RX ORDER — BENAZEPRIL HYDROCHLORIDE 20 MG/1
TABLET ORAL
Qty: 90 TABLET | Refills: 0 | Status: SHIPPED | OUTPATIENT
Start: 2023-05-02

## 2023-05-10 ENCOUNTER — TELEPHONE (OUTPATIENT)
Facility: CLINIC | Age: 73
End: 2023-05-10

## 2023-05-10 DIAGNOSIS — E78.2 MIXED HYPERLIPIDEMIA: ICD-10-CM

## 2023-05-10 DIAGNOSIS — Z79.4 TYPE 2 DIABETES MELLITUS WITH HYPERGLYCEMIA, WITH LONG-TERM CURRENT USE OF INSULIN (HCC): Primary | ICD-10-CM

## 2023-05-10 DIAGNOSIS — E11.65 TYPE 2 DIABETES MELLITUS WITH HYPERGLYCEMIA, WITH LONG-TERM CURRENT USE OF INSULIN (HCC): Primary | ICD-10-CM

## 2023-05-10 RX ORDER — SIMVASTATIN 40 MG
40 TABLET ORAL NIGHTLY
Qty: 90 TABLET | Refills: 1 | Status: SHIPPED | OUTPATIENT
Start: 2023-05-10

## 2023-05-10 NOTE — TELEPHONE ENCOUNTER
Pt called in regards to needing a refill of Simvastatin 40 to the [facility] on [address].  Pt states that NP David Ramirez said that she would take over this medication from previous provider

## 2023-05-29 RX ORDER — CELECOXIB 100 MG/1
100 CAPSULE ORAL 2 TIMES DAILY
COMMUNITY
Start: 2023-02-23

## 2023-05-29 RX ORDER — BENAZEPRIL HYDROCHLORIDE 20 MG/1
1 TABLET ORAL DAILY
COMMUNITY
Start: 2023-05-02 | End: 2023-07-21

## 2023-05-29 RX ORDER — CETIRIZINE HYDROCHLORIDE 10 MG/1
CAPSULE, LIQUID FILLED ORAL
COMMUNITY

## 2023-05-29 RX ORDER — CHLORAL HYDRATE 500 MG
2 CAPSULE ORAL 2 TIMES DAILY
COMMUNITY

## 2023-05-29 RX ORDER — SEMAGLUTIDE 2.68 MG/ML
INJECTION, SOLUTION SUBCUTANEOUS
COMMUNITY
Start: 2023-04-23 | End: 2023-06-01

## 2023-06-01 RX ORDER — SEMAGLUTIDE 2.68 MG/ML
INJECTION, SOLUTION SUBCUTANEOUS
Qty: 3 ML | Refills: 0 | Status: SHIPPED | OUTPATIENT
Start: 2023-06-01

## 2023-06-01 NOTE — TELEPHONE ENCOUNTER
Spoke to pt she stated that the endocrinologist office told her that when they had a new provider start they would call her and she has not heard anything from them.

## 2023-06-22 RX ORDER — SEMAGLUTIDE 2.68 MG/ML
INJECTION, SOLUTION SUBCUTANEOUS
Qty: 3 ML | Refills: 0 | Status: SHIPPED | OUTPATIENT
Start: 2023-06-22

## 2023-07-18 RX ORDER — SEMAGLUTIDE 2.68 MG/ML
INJECTION, SOLUTION SUBCUTANEOUS
Qty: 6 ML | Refills: 0 | Status: SHIPPED | OUTPATIENT
Start: 2023-07-18

## 2023-07-21 RX ORDER — BENAZEPRIL HYDROCHLORIDE 20 MG/1
TABLET ORAL
Qty: 90 TABLET | Refills: 0 | Status: SHIPPED | OUTPATIENT
Start: 2023-07-21

## 2023-07-27 ENCOUNTER — OFFICE VISIT (OUTPATIENT)
Facility: CLINIC | Age: 73
End: 2023-07-27
Payer: COMMERCIAL

## 2023-07-27 VITALS
RESPIRATION RATE: 16 BRPM | OXYGEN SATURATION: 98 % | DIASTOLIC BLOOD PRESSURE: 78 MMHG | HEART RATE: 100 BPM | BODY MASS INDEX: 30.35 KG/M2 | HEIGHT: 67 IN | WEIGHT: 193.38 LBS | TEMPERATURE: 97.5 F | SYSTOLIC BLOOD PRESSURE: 130 MMHG

## 2023-07-27 DIAGNOSIS — E11.65 TYPE 2 DIABETES MELLITUS WITH HYPERGLYCEMIA, WITH LONG-TERM CURRENT USE OF INSULIN (HCC): ICD-10-CM

## 2023-07-27 DIAGNOSIS — E66.09 CLASS 1 OBESITY DUE TO EXCESS CALORIES WITH SERIOUS COMORBIDITY AND BODY MASS INDEX (BMI) OF 30.0 TO 30.9 IN ADULT: ICD-10-CM

## 2023-07-27 DIAGNOSIS — Z79.4 TYPE 2 DIABETES MELLITUS WITH HYPERGLYCEMIA, WITH LONG-TERM CURRENT USE OF INSULIN (HCC): ICD-10-CM

## 2023-07-27 DIAGNOSIS — I10 BENIGN ESSENTIAL HTN: ICD-10-CM

## 2023-07-27 DIAGNOSIS — Z00.00 MEDICARE ANNUAL WELLNESS VISIT, SUBSEQUENT: Primary | ICD-10-CM

## 2023-07-27 PROCEDURE — 3075F SYST BP GE 130 - 139MM HG: CPT | Performed by: NURSE PRACTITIONER

## 2023-07-27 PROCEDURE — 3078F DIAST BP <80 MM HG: CPT | Performed by: NURSE PRACTITIONER

## 2023-07-27 PROCEDURE — G0439 PPPS, SUBSEQ VISIT: HCPCS | Performed by: NURSE PRACTITIONER

## 2023-07-27 PROCEDURE — 3046F HEMOGLOBIN A1C LEVEL >9.0%: CPT | Performed by: NURSE PRACTITIONER

## 2023-07-27 PROCEDURE — 99214 OFFICE O/P EST MOD 30 MIN: CPT | Performed by: NURSE PRACTITIONER

## 2023-07-27 PROCEDURE — 1123F ACP DISCUSS/DSCN MKR DOCD: CPT | Performed by: NURSE PRACTITIONER

## 2023-07-27 SDOH — ECONOMIC STABILITY: FOOD INSECURITY: WITHIN THE PAST 12 MONTHS, YOU WORRIED THAT YOUR FOOD WOULD RUN OUT BEFORE YOU GOT MONEY TO BUY MORE.: NEVER TRUE

## 2023-07-27 SDOH — ECONOMIC STABILITY: INCOME INSECURITY: HOW HARD IS IT FOR YOU TO PAY FOR THE VERY BASICS LIKE FOOD, HOUSING, MEDICAL CARE, AND HEATING?: NOT HARD AT ALL

## 2023-07-27 SDOH — ECONOMIC STABILITY: FOOD INSECURITY: WITHIN THE PAST 12 MONTHS, THE FOOD YOU BOUGHT JUST DIDN'T LAST AND YOU DIDN'T HAVE MONEY TO GET MORE.: NEVER TRUE

## 2023-07-27 SDOH — ECONOMIC STABILITY: HOUSING INSECURITY
IN THE LAST 12 MONTHS, WAS THERE A TIME WHEN YOU DID NOT HAVE A STEADY PLACE TO SLEEP OR SLEPT IN A SHELTER (INCLUDING NOW)?: NO

## 2023-07-27 ASSESSMENT — PATIENT HEALTH QUESTIONNAIRE - PHQ9
2. FEELING DOWN, DEPRESSED OR HOPELESS: 0
SUM OF ALL RESPONSES TO PHQ QUESTIONS 1-9: 0
1. LITTLE INTEREST OR PLEASURE IN DOING THINGS: 0
SUM OF ALL RESPONSES TO PHQ9 QUESTIONS 1 & 2: 0
SUM OF ALL RESPONSES TO PHQ QUESTIONS 1-9: 0

## 2023-07-27 ASSESSMENT — LIFESTYLE VARIABLES
HOW MANY STANDARD DRINKS CONTAINING ALCOHOL DO YOU HAVE ON A TYPICAL DAY: 1 OR 2
HOW OFTEN DO YOU HAVE A DRINK CONTAINING ALCOHOL: MONTHLY OR LESS

## 2023-07-27 NOTE — PROGRESS NOTES
Medicare Annual Wellness Visit    Urbano Soulier is here for Medicare AWV and Follow-up (DM)    Assessment & Plan   Medicare annual wellness visit, subsequent    Class 1 obesity due to excess calories with serious comorbidity and body mass index (BMI) of 30.0 to 30.9 in adult  Stay active and exercise as tolerated. Limit portions. Eat a healthy diet with little to no sugar and low carb intake. Benign essential HTN  BP is below goal in the office today and on reported home readings. Continue to check home BP readings regularly and call if consistently greater than 140/90 on either number. Type 2 diabetes mellitus with hyperglycemia, with long-term current use of insulin ([address])  Reported fasting glucose readings are above goal. Checking A1c today. Risk for hypoglycemia and previous medication reactions offer few additional treatment options in a primary care setting. Until seen by endocrinology, recommend increasing insulin by 2-3 units every 3 days until fasting glucose is less than 140. Ideal fasting glucose less than 130 but she is a high risk for hypoglycemia with insulin and Ozempic. Cautioned higher insulin may increase weight gain. Discussed the importance of staying active, exercising as tolerated, avoiding sugar, and limiting carbohydrate intake. Call for any concerns. -     Hemoglobin A1C    Recommendations for Preventive Services Due: see orders and patient instructions/AVS.  Recommended screening schedule for the next 5-10 years is provided to the patient in written form: see Patient Instructions/AVS.     Return in about 4 months (around 11/27/2023) for follow up, HTN, T2DM, nonfasting. Subjective   Patient presents for Medicare wellness, fasting labs, and management of  Hypertension and type 2 diabetes. Annual chronic disease management and labs completed 3/2023.     Management of HTN-  Current meds: Benazepril 20 mg daily  Home BP readings: consistenly <130/80  Denies lightheadedness,

## 2023-07-28 LAB — HBA1C MFR BLD: 11 % (ref 4.8–5.6)

## 2023-08-11 ENCOUNTER — TELEPHONE (OUTPATIENT)
Facility: CLINIC | Age: 73
End: 2023-08-11

## 2023-08-11 NOTE — TELEPHONE ENCOUNTER
Pt called in regards to being told by pharmacy that they are currently out of stock of the Ozempic 2mg dose.  They told pt that they have two other doses avilable and that the PCP office just needs to call to select one

## 2023-08-14 ENCOUNTER — TELEPHONE (OUTPATIENT)
Facility: CLINIC | Age: 73
End: 2023-08-14

## 2023-08-14 NOTE — TELEPHONE ENCOUNTER
Patient called for a refill on Ozempic.  Said she called it in to the [address] [place] [address] ([address] [place] [address]) 2 weeks ago but they told her they were out of stock
Mild hyponatremia of 133 on admission -> 131 4-hours post initial CMP (notably this followed IV hydration with 3L LR)  -Kidney and bladder U/S  -Urine osms, Na, K, Cl, Cr

## 2023-08-16 RX ORDER — SEMAGLUTIDE 2.68 MG/ML
INJECTION, SOLUTION SUBCUTANEOUS
Qty: 9 ML | Refills: 1 | Status: SHIPPED | OUTPATIENT
Start: 2023-08-16

## 2023-09-15 ENCOUNTER — TELEPHONE (OUTPATIENT)
Facility: CLINIC | Age: 73
End: 2023-09-15

## 2023-09-15 DIAGNOSIS — E11.65 TYPE 2 DIABETES MELLITUS WITH HYPERGLYCEMIA, WITH LONG-TERM CURRENT USE OF INSULIN (HCC): Primary | ICD-10-CM

## 2023-09-15 DIAGNOSIS — Z79.4 TYPE 2 DIABETES MELLITUS WITH HYPERGLYCEMIA, WITH LONG-TERM CURRENT USE OF INSULIN (HCC): Primary | ICD-10-CM

## 2023-09-15 NOTE — TELEPHONE ENCOUNTER
Received a fax from [employer] requesting a refill on:   1) Comfort EZ [id number]  Doesn't look like we've never refilled this. I see Dr. Daisha Lang filled this on 5/5/22. Does pt need an appt?  Please advise

## 2023-11-02 DIAGNOSIS — Z79.4 UNCONTROLLED TYPE 2 DIABETES MELLITUS WITH HYPERGLYCEMIA, WITH LONG-TERM CURRENT USE OF INSULIN (HCC): Primary | ICD-10-CM

## 2023-11-02 DIAGNOSIS — E78.2 MIXED HYPERLIPIDEMIA: ICD-10-CM

## 2023-11-02 DIAGNOSIS — Z79.4 TYPE 2 DIABETES MELLITUS WITH HYPERGLYCEMIA, WITH LONG-TERM CURRENT USE OF INSULIN (HCC): ICD-10-CM

## 2023-11-02 DIAGNOSIS — E11.65 TYPE 2 DIABETES MELLITUS WITH HYPERGLYCEMIA, WITH LONG-TERM CURRENT USE OF INSULIN (HCC): ICD-10-CM

## 2023-11-02 DIAGNOSIS — E11.65 UNCONTROLLED TYPE 2 DIABETES MELLITUS WITH HYPERGLYCEMIA, WITH LONG-TERM CURRENT USE OF INSULIN (HCC): Primary | ICD-10-CM

## 2023-11-02 RX ORDER — INSULIN DETEMIR 100 [IU]/ML
INJECTION, SOLUTION SUBCUTANEOUS
Qty: 15 ML | Refills: 0 | Status: SHIPPED | OUTPATIENT
Start: 2023-11-02

## 2023-11-02 RX ORDER — SIMVASTATIN 40 MG
40 TABLET ORAL NIGHTLY
Qty: 90 TABLET | Refills: 0 | Status: SHIPPED | OUTPATIENT
Start: 2023-11-02

## 2023-11-02 RX ORDER — BENAZEPRIL HYDROCHLORIDE 20 MG/1
TABLET ORAL
Qty: 90 TABLET | Refills: 0 | Status: SHIPPED | OUTPATIENT
Start: 2023-11-02

## 2023-11-02 NOTE — TELEPHONE ENCOUNTER
I have refilled her insulin and referred her to the new endocrinology provider for further management. Please have her schedule ASAP.

## 2024-01-04 ENCOUNTER — OFFICE VISIT (OUTPATIENT)
Facility: CLINIC | Age: 74
End: 2024-01-04
Payer: MEDICARE

## 2024-01-04 VITALS
OXYGEN SATURATION: 98 % | WEIGHT: 194.38 LBS | HEART RATE: 88 BPM | DIASTOLIC BLOOD PRESSURE: 86 MMHG | RESPIRATION RATE: 16 BRPM | SYSTOLIC BLOOD PRESSURE: 118 MMHG | BODY MASS INDEX: 30.51 KG/M2 | TEMPERATURE: 97.5 F | HEIGHT: 67 IN

## 2024-01-04 DIAGNOSIS — E11.65 UNCONTROLLED TYPE 2 DIABETES MELLITUS WITH HYPERGLYCEMIA, WITH LONG-TERM CURRENT USE OF INSULIN (HCC): ICD-10-CM

## 2024-01-04 DIAGNOSIS — Z79.4 UNCONTROLLED TYPE 2 DIABETES MELLITUS WITH HYPERGLYCEMIA, WITH LONG-TERM CURRENT USE OF INSULIN (HCC): ICD-10-CM

## 2024-01-04 DIAGNOSIS — E66.09 CLASS 1 OBESITY DUE TO EXCESS CALORIES WITH SERIOUS COMORBIDITY AND BODY MASS INDEX (BMI) OF 30.0 TO 30.9 IN ADULT: ICD-10-CM

## 2024-01-04 DIAGNOSIS — I10 BENIGN ESSENTIAL HTN: Primary | ICD-10-CM

## 2024-01-04 DIAGNOSIS — E11.319 DIABETIC RETINOPATHY ASSOCIATED WITH TYPE 2 DIABETES MELLITUS, MACULAR EDEMA PRESENCE UNSPECIFIED, UNSPECIFIED LATERALITY, UNSPECIFIED RETINOPATHY SEVERITY (HCC): ICD-10-CM

## 2024-01-04 PROCEDURE — 3079F DIAST BP 80-89 MM HG: CPT | Performed by: NURSE PRACTITIONER

## 2024-01-04 PROCEDURE — 3074F SYST BP LT 130 MM HG: CPT | Performed by: NURSE PRACTITIONER

## 2024-01-04 PROCEDURE — 99214 OFFICE O/P EST MOD 30 MIN: CPT | Performed by: NURSE PRACTITIONER

## 2024-01-04 PROCEDURE — 1123F ACP DISCUSS/DSCN MKR DOCD: CPT | Performed by: NURSE PRACTITIONER

## 2024-01-04 ASSESSMENT — PATIENT HEALTH QUESTIONNAIRE - PHQ9
SUM OF ALL RESPONSES TO PHQ QUESTIONS 1-9: 0
1. LITTLE INTEREST OR PLEASURE IN DOING THINGS: 0
SUM OF ALL RESPONSES TO PHQ QUESTIONS 1-9: 0
2. FEELING DOWN, DEPRESSED OR HOPELESS: 0
SUM OF ALL RESPONSES TO PHQ9 QUESTIONS 1 & 2: 0
SUM OF ALL RESPONSES TO PHQ QUESTIONS 1-9: 0
SUM OF ALL RESPONSES TO PHQ QUESTIONS 1-9: 0

## 2024-01-04 NOTE — PROGRESS NOTES
Chief Complaint   Patient presents with    Follow-up     DM, HTN    1. Have you been to the ER, urgent care clinic since your last visit?  Hospitalized since your last visit?No    2. Have you seen or consulted any other health care providers outside of the [facility] since your last visit?   No     3. For patients aged 45-75: Has the patient had a colonoscopy / FIT/ Cologuard? Yes-No Care Gap Present    If the patient is female:    4. For patients aged 40-74: Has the patient had a mammogram within the past 2 years?  Yes-No Care Gap Present      5. For patients aged 21-65: Has the patient had a pap smear?  Yes-No Care Gap PresentBP 118/86 (Site: Left Upper Arm, Position: Sitting, Cuff Size: Medium Adult)   Pulse 88   Temp 97.5 °F (36.4 °C) (Temporal)   Resp 16   Ht 1.702 m (5' 7\")   Wt 88.2 kg (194 lb 6 oz)   SpO2 98%   BMI 30.44 kg/m²  PHQ-9 Total Score: 0 (1/4/2024  9:40 AM)

## 2024-01-04 NOTE — PROGRESS NOTES
Subjective  Chief Complaint   Patient presents with    Follow-up     DM, HTN     HPI:  Lara Montemayor is a 73 y.o. female.  Patient presents for management of hypertension, type 2 diabetes, and obesity.    Management of HTN-  Current meds: Benazepril 20 mg daily  Home BP readings: 110-120/80s  Denies lightheadedness, dizziness, headaches, chest pain, palpitations, and lower extremity edema.    Management of T2DM- scheduled to see endocrinology 2/7/2024  Current meds: Metformin 1000 mg twice daily, Levemir 40units daily, and Ozempic 2 mg weekly  Compliant with medication: yes  Last A1c: 11.0% 7/27/2023  Home glucose monitoring range: varies, fasting 120-190  Hypoglycemia: denies  Increased thirst, appetite, and urination: denies  Vision changes, last eye exam: denies, 3 weeks ago- Dr. Vazquez  Checks feet regularly, numbness and injury: yes, denies  Last foot exam: 3/2023  Compliant with statin: yes, Simvastatin nightly  Compliant with ACEI/ARB: yes  Last microalbumin: negative 3/2023  Pneumovax: complete  Compliant with diet: no especially during the holidays  Complaint with exercise: no  Personal h/o CV disease including MI, stroke, and PVD: denies      Past Medical History:   Diagnosis Date    Essential hypertension, malignant     Frozen shoulder     History of mammogram 12/15/2020    Mixed hyperlipidemia     Type II or unspecified type diabetes mellitus with renal manifestations, uncontrolled(250.42)      Family History   Problem Relation Age of Onset    Diabetes Maternal Aunt     Hypertension Paternal Aunt     Atrial Fibrillation Father     Cancer Father         Lung    Heart Failure Mother     Diabetes Sister     Heart Disease Mother      Social History     Socioeconomic History    Marital status:      Spouse name: Not on file    Number of children: Not on file    Years of education: Not on file    Highest education level: Not on file   Occupational History    Not on file   Tobacco Use    Smoking status:

## 2024-01-05 LAB
BUN SERPL-MCNC: 17 MG/DL (ref 8–27)
BUN/CREAT SERPL: 24 (ref 12–28)
CALCIUM SERPL-MCNC: 9.6 MG/DL (ref 8.7–10.3)
CHLORIDE SERPL-SCNC: 100 MMOL/L (ref 96–106)
CO2 SERPL-SCNC: 26 MMOL/L (ref 20–29)
CREAT SERPL-MCNC: 0.71 MG/DL (ref 0.57–1)
EGFRCR SERPLBLD CKD-EPI 2021: 90 ML/MIN/1.73
GLUCOSE SERPL-MCNC: 248 MG/DL (ref 70–99)
HBA1C MFR BLD: 10.1 % (ref 4.8–5.6)
POTASSIUM SERPL-SCNC: 4.6 MMOL/L (ref 3.5–5.2)
SODIUM SERPL-SCNC: 138 MMOL/L (ref 134–144)

## 2024-01-22 DIAGNOSIS — Z79.4 UNCONTROLLED TYPE 2 DIABETES MELLITUS WITH HYPERGLYCEMIA, WITH LONG-TERM CURRENT USE OF INSULIN (HCC): ICD-10-CM

## 2024-01-22 DIAGNOSIS — E78.2 MIXED HYPERLIPIDEMIA: ICD-10-CM

## 2024-01-22 DIAGNOSIS — E11.65 UNCONTROLLED TYPE 2 DIABETES MELLITUS WITH HYPERGLYCEMIA, WITH LONG-TERM CURRENT USE OF INSULIN (HCC): ICD-10-CM

## 2024-01-22 DIAGNOSIS — E11.65 TYPE 2 DIABETES MELLITUS WITH HYPERGLYCEMIA, WITH LONG-TERM CURRENT USE OF INSULIN (HCC): ICD-10-CM

## 2024-01-22 DIAGNOSIS — Z79.4 TYPE 2 DIABETES MELLITUS WITH HYPERGLYCEMIA, WITH LONG-TERM CURRENT USE OF INSULIN (HCC): ICD-10-CM

## 2024-01-23 RX ORDER — SIMVASTATIN 40 MG
40 TABLET ORAL NIGHTLY
Qty: 90 TABLET | Refills: 2 | Status: SHIPPED | OUTPATIENT
Start: 2024-01-23

## 2024-01-23 RX ORDER — INSULIN DETEMIR 100 [IU]/ML
INJECTION, SOLUTION SUBCUTANEOUS
Qty: 15 ML | Refills: 0 | Status: SHIPPED | OUTPATIENT
Start: 2024-01-23

## 2024-01-23 RX ORDER — BENAZEPRIL HYDROCHLORIDE 20 MG/1
TABLET ORAL
Qty: 90 TABLET | Refills: 1 | Status: SHIPPED | OUTPATIENT
Start: 2024-01-23

## 2024-02-07 ENCOUNTER — OFFICE VISIT (OUTPATIENT)
Age: 74
End: 2024-02-07
Payer: MEDICARE

## 2024-02-07 VITALS
HEART RATE: 90 BPM | WEIGHT: 190 LBS | TEMPERATURE: 97.7 F | HEIGHT: 67 IN | OXYGEN SATURATION: 98 % | SYSTOLIC BLOOD PRESSURE: 110 MMHG | BODY MASS INDEX: 29.82 KG/M2 | DIASTOLIC BLOOD PRESSURE: 67 MMHG

## 2024-02-07 DIAGNOSIS — E78.2 MIXED HYPERLIPIDEMIA: ICD-10-CM

## 2024-02-07 DIAGNOSIS — Z79.4 LONG TERM (CURRENT) USE OF INSULIN (HCC): Primary | ICD-10-CM

## 2024-02-07 DIAGNOSIS — I10 ESSENTIAL (PRIMARY) HYPERTENSION: ICD-10-CM

## 2024-02-07 DIAGNOSIS — Z79.4 TYPE 2 DIABETES MELLITUS WITH HYPERGLYCEMIA, WITH LONG-TERM CURRENT USE OF INSULIN (HCC): ICD-10-CM

## 2024-02-07 DIAGNOSIS — E11.65 TYPE 2 DIABETES MELLITUS WITH HYPERGLYCEMIA, WITH LONG-TERM CURRENT USE OF INSULIN (HCC): ICD-10-CM

## 2024-02-07 PROCEDURE — 3078F DIAST BP <80 MM HG: CPT | Performed by: STUDENT IN AN ORGANIZED HEALTH CARE EDUCATION/TRAINING PROGRAM

## 2024-02-07 PROCEDURE — 3074F SYST BP LT 130 MM HG: CPT | Performed by: STUDENT IN AN ORGANIZED HEALTH CARE EDUCATION/TRAINING PROGRAM

## 2024-02-07 PROCEDURE — 1123F ACP DISCUSS/DSCN MKR DOCD: CPT | Performed by: STUDENT IN AN ORGANIZED HEALTH CARE EDUCATION/TRAINING PROGRAM

## 2024-02-07 PROCEDURE — 3046F HEMOGLOBIN A1C LEVEL >9.0%: CPT | Performed by: STUDENT IN AN ORGANIZED HEALTH CARE EDUCATION/TRAINING PROGRAM

## 2024-02-07 PROCEDURE — 99214 OFFICE O/P EST MOD 30 MIN: CPT | Performed by: STUDENT IN AN ORGANIZED HEALTH CARE EDUCATION/TRAINING PROGRAM

## 2024-02-07 RX ORDER — INSULIN GLARGINE 100 [IU]/ML
38 INJECTION, SOLUTION SUBCUTANEOUS NIGHTLY
Qty: 5 ADJUSTABLE DOSE PRE-FILLED PEN SYRINGE | Refills: 0 | Status: SHIPPED | OUTPATIENT
Start: 2024-02-07

## 2024-02-07 RX ORDER — TIRZEPATIDE 5 MG/.5ML
5 INJECTION, SOLUTION SUBCUTANEOUS WEEKLY
Qty: 4 ADJUSTABLE DOSE PRE-FILLED PEN SYRINGE | Refills: 1 | Status: SHIPPED | OUTPATIENT
Start: 2024-02-07

## 2024-02-07 RX ORDER — PEN NEEDLE, DIABETIC 32GX 5/32"
1 NEEDLE, DISPOSABLE MISCELLANEOUS DAILY
Qty: 100 EACH | Refills: 3 | Status: SHIPPED | OUTPATIENT
Start: 2024-02-07

## 2024-02-07 RX ORDER — ACYCLOVIR 400 MG/1
TABLET ORAL
Qty: 1 EACH | Refills: 1 | Status: SHIPPED | OUTPATIENT
Start: 2024-02-07

## 2024-02-07 RX ORDER — ACYCLOVIR 400 MG/1
TABLET ORAL
Qty: 9 EACH | Refills: 3 | Status: SHIPPED | OUTPATIENT
Start: 2024-02-07

## 2024-02-07 NOTE — PROGRESS NOTES
[facility]                                                                                    Leelee Simmons M.D          Patient Information  Date:2/7/2024  Name : Lara Montemayor 73 y.o.     YOB: 1950         Referred by: Teresa Juarez APRN - NP       Chief Complaint   Patient presents with    New Patient       History of Present Illness: Lara Montemayor is a 73 y.o. female with hypertension, hyperlipidemia is here for follow-up of type 2 diabetes mellitus. She remains in primary care of ROSAL[facility] Olivia    She was last seen by Dr. Granger in 5/22.     Now taking Levemir 40 units, ozempic 2 mg, metoformin     Patient reports biggest barrier to diabetes care is her eating habits.   She reports that she does eat a lot of cookies.   Reports eating diet rich in carb and fat.       Updated diabetes history:  · Diagnosis: 20 years    · Current treatment: as above     · Past treatment: Victoza (stopped when started on Ozempic)  Jardiance yeast infection.     · Glucose checks: 170-250.     · Hyperglycemia: yes    · Hypoglycemia: no    · Meals per day: 3, breakfast: eggs and toast, lunch: salad and sandwich, dinner: meat and veg, starch, bedtime snacks: pie, cereal, snacks: nuts cheese sticks     · Exercise: unable to    · DM related hospitalizations: no        Complications of DM:    · CAD: no    · CVA: no    · PVD: no    · Amputations: no     · Retinopathy: yes definitive diabetic retinopathy right eye, moderate nonproliferative diabetic retinopathy left eye, sees every 6 months     · Gastropathy: no    · Nephropathy: no    · Neuropathy: yes        Medications:    · Statin: simvastatin 40    · ACE-I: benazepril    · ASA: yes     Past Medical History:   Diagnosis Date    Essential hypertension, malignant     Frozen shoulder     History of mammogram 12/15/2020    Mixed hyperlipidemia     Type II or unspecified type diabetes mellitus with renal manifestations,

## 2024-02-07 NOTE — PROGRESS NOTES
Chief Complaint   Patient presents with    New Patient    /67 (Site: Right Upper Arm, Position: Sitting, Cuff Size: Medium Adult)   Pulse 90   Temp 97.7 °F (36.5 °C) (Oral)   Ht 1.702 m (5' 7\")   Wt 86.2 kg (190 lb)   SpO2 98%   BMI 29.76 kg/m²  1. \"Have you been to the ER, urgent care clinic since your last visit?  Hospitalized since your last visit?\" No    2. \"Have you seen or consulted any other health care providers outside of the [facility] since your last visit?\" No     3. For patients aged 45-75: Has the patient had a colonoscopy / FIT/ Cologuard? Yes - Care Gap present. Most recent result on file      If the patient is female:    4. For patients aged 40-74: Has the patient had a mammogram within the past 2 years? Yes - Care Gap present. Most recent result on file      5. For patients aged 21-65: Has the patient had a pap smear? NA - based on age or sex

## 2024-03-27 ENCOUNTER — PATIENT MESSAGE (OUTPATIENT)
Age: 74
End: 2024-03-27

## 2024-03-27 ENCOUNTER — OFFICE VISIT (OUTPATIENT)
Age: 74
End: 2024-03-27

## 2024-03-27 VITALS
BODY MASS INDEX: 30.18 KG/M2 | HEART RATE: 96 BPM | WEIGHT: 192.3 LBS | DIASTOLIC BLOOD PRESSURE: 80 MMHG | OXYGEN SATURATION: 98 % | TEMPERATURE: 97.6 F | SYSTOLIC BLOOD PRESSURE: 120 MMHG | HEIGHT: 67 IN

## 2024-03-27 DIAGNOSIS — I10 ESSENTIAL (PRIMARY) HYPERTENSION: ICD-10-CM

## 2024-03-27 DIAGNOSIS — E78.2 MIXED HYPERLIPIDEMIA: ICD-10-CM

## 2024-03-27 DIAGNOSIS — Z79.4 TYPE 2 DIABETES MELLITUS WITH HYPERGLYCEMIA, WITH LONG-TERM CURRENT USE OF INSULIN (HCC): Primary | ICD-10-CM

## 2024-03-27 DIAGNOSIS — E11.65 TYPE 2 DIABETES MELLITUS WITH HYPERGLYCEMIA, WITH LONG-TERM CURRENT USE OF INSULIN (HCC): Primary | ICD-10-CM

## 2024-03-27 LAB — GLUCOSE, POC: 107 MG/DL

## 2024-03-27 RX ORDER — TIRZEPATIDE 7.5 MG/.5ML
7.5 INJECTION, SOLUTION SUBCUTANEOUS WEEKLY
Qty: 4 ADJUSTABLE DOSE PRE-FILLED PEN SYRINGE | Refills: 3 | Status: SHIPPED | OUTPATIENT
Start: 2024-03-27

## 2024-03-27 RX ORDER — INSULIN GLARGINE-YFGN 100 [IU]/ML
34 INJECTION, SOLUTION SUBCUTANEOUS NIGHTLY
COMMUNITY
Start: 2024-02-07

## 2024-03-27 NOTE — PROGRESS NOTES
Chief Complaint   Patient presents with    Diabetes       \"Have you been to the ER, urgent care clinic since your last visit?  Hospitalized since your last visit?\"    NO    “Have you seen or consulted any other health care providers outside of [facility] since your last visit?”    YES - When: approximately 4  weeks ago.  Where and Why: PT with [facility] for frozen shoulder.            Click Here for Release of Records Request      
Per HPI    Physical Examination:  Blood pressure 120/80, pulse 96, temperature 97.6 °F (36.4 °C), temperature source Oral, height 1.702 m (5' 7\"), weight 87.2 kg (192 lb 4.8 oz), SpO2 98 %. Body mass index is 30.12 kg/m².  General: pleasant, no distress, good eye contact  HEENT: no pallor, no periorbital edema, EOMI  Neck: supple, no thyromegaly, no nodules  Cardiovascular: regular,  normal S1 and S2,   Respiratory: clear to auscultation bilaterally  Gastrointestinal: soft, nontender,   Musculoskeletal: no edema  Neurological: alert and oriented  Psychiatric: normal mood and affect    Data Reviewed:     Lab Results   Component Value Date/Time    GLUCPOC 107 03/27/2024 09:52 AM      Lab Results   Component Value Date/Time    GFRAA 88 02/23/2021 11:46 AM    BUN 17 01/04/2024 10:07 AM     01/04/2024 10:07 AM    K 4.6 01/04/2024 10:07 AM     01/04/2024 10:07 AM    CO2 26 01/04/2024 10:07 AM       Results for orders placed or performed in visit on 03/27/24   AMB POC GLUCOSE BLOOD, BY GLUCOSE MONITORING DEVICE   Result Value Ref Range    Glucose,  MG/DL         Assessment/Plan:     1. Type 2 diabetes mellitus with hyperglycemia, with long-term current use of insulin ([facility])        Orders Placed This Encounter   Procedures    AMB POC GLUCOSE BLOOD, BY GLUCOSE MONITORING DEVICE       Type 2 DM   -uncontrolled as evidenced by a1c of 10% (2/2024)   CGMS download : dexcom g7  Date : 13-27th March   Patterns : post prandial hyperglycemia  Overview 62% in range, 32% in high, 4% high, 1% low 1% very ronal  Additional comments : See CGMS download and CGMS log in    CGMS recommendations     Recommendations  Continue Lantus - decrease to 30 units patient will check with insurance which alternative is covered  Continue  mounjaro increase -7.5 mg /week after she finishes the 5 mg  Discussed measures with mounjaro to decrease nausea or vomiting.   Patient has a diagnosis of diabetes;   Patient is currently

## 2024-03-27 NOTE — PATIENT INSTRUCTIONS
Alternatives to lantus would be BASAGLAR, SEMEGLEE, Toujeo, TRESIBA  Increase moujaro to 7.5 mg/week

## 2024-03-28 RX ORDER — INSULIN GLARGINE 100 [IU]/ML
30 INJECTION, SOLUTION SUBCUTANEOUS NIGHTLY
Qty: 5 ADJUSTABLE DOSE PRE-FILLED PEN SYRINGE | Refills: 0 | Status: SHIPPED | OUTPATIENT
Start: 2024-03-28

## 2024-03-28 NOTE — TELEPHONE ENCOUNTER
Gia Lopez MA 3/28/2024 8:22 AM EDT      ----- Message -----  From: Lara Montemayor  Sent: 3/27/2024 5:39 PM EDT  To: [facility] Clinical Staff  Subject: substitutes for Glargin     I was taking Levemir, they said it would no longer be available.

## 2024-03-29 RX ORDER — TIRZEPATIDE 5 MG/.5ML
INJECTION, SOLUTION SUBCUTANEOUS
Qty: 4 ML | Refills: 0 | OUTPATIENT
Start: 2024-03-29

## 2024-06-03 RX ORDER — INSULIN GLARGINE 100 [IU]/ML
INJECTION, SOLUTION SUBCUTANEOUS
Qty: 15 ML | Refills: 0 | Status: SHIPPED | OUTPATIENT
Start: 2024-06-03

## 2024-07-02 ENCOUNTER — OFFICE VISIT (OUTPATIENT)
Age: 74
End: 2024-07-02
Payer: MEDICARE

## 2024-07-02 VITALS
RESPIRATION RATE: 18 BRPM | TEMPERATURE: 97.8 F | SYSTOLIC BLOOD PRESSURE: 120 MMHG | BODY MASS INDEX: 29.84 KG/M2 | HEIGHT: 67 IN | DIASTOLIC BLOOD PRESSURE: 79 MMHG | HEART RATE: 96 BPM | OXYGEN SATURATION: 98 % | WEIGHT: 190.1 LBS

## 2024-07-02 DIAGNOSIS — E11.65 TYPE 2 DIABETES MELLITUS WITH HYPERGLYCEMIA, WITH LONG-TERM CURRENT USE OF INSULIN (HCC): ICD-10-CM

## 2024-07-02 DIAGNOSIS — I10 ESSENTIAL (PRIMARY) HYPERTENSION: ICD-10-CM

## 2024-07-02 DIAGNOSIS — E78.2 MIXED HYPERLIPIDEMIA: ICD-10-CM

## 2024-07-02 DIAGNOSIS — E11.65 TYPE 2 DIABETES MELLITUS WITH HYPERGLYCEMIA, WITH LONG-TERM CURRENT USE OF INSULIN (HCC): Primary | ICD-10-CM

## 2024-07-02 DIAGNOSIS — Z79.4 TYPE 2 DIABETES MELLITUS WITH HYPERGLYCEMIA, WITH LONG-TERM CURRENT USE OF INSULIN (HCC): Primary | ICD-10-CM

## 2024-07-02 DIAGNOSIS — Z79.4 TYPE 2 DIABETES MELLITUS WITH HYPERGLYCEMIA, WITH LONG-TERM CURRENT USE OF INSULIN (HCC): ICD-10-CM

## 2024-07-02 LAB — HBA1C MFR BLD: 7.4 %

## 2024-07-02 PROCEDURE — 83036 HEMOGLOBIN GLYCOSYLATED A1C: CPT | Performed by: STUDENT IN AN ORGANIZED HEALTH CARE EDUCATION/TRAINING PROGRAM

## 2024-07-02 PROCEDURE — 3074F SYST BP LT 130 MM HG: CPT | Performed by: STUDENT IN AN ORGANIZED HEALTH CARE EDUCATION/TRAINING PROGRAM

## 2024-07-02 PROCEDURE — 1123F ACP DISCUSS/DSCN MKR DOCD: CPT | Performed by: STUDENT IN AN ORGANIZED HEALTH CARE EDUCATION/TRAINING PROGRAM

## 2024-07-02 PROCEDURE — 3046F HEMOGLOBIN A1C LEVEL >9.0%: CPT | Performed by: STUDENT IN AN ORGANIZED HEALTH CARE EDUCATION/TRAINING PROGRAM

## 2024-07-02 PROCEDURE — 3078F DIAST BP <80 MM HG: CPT | Performed by: STUDENT IN AN ORGANIZED HEALTH CARE EDUCATION/TRAINING PROGRAM

## 2024-07-02 PROCEDURE — 99213 OFFICE O/P EST LOW 20 MIN: CPT | Performed by: STUDENT IN AN ORGANIZED HEALTH CARE EDUCATION/TRAINING PROGRAM

## 2024-07-02 RX ORDER — PEN NEEDLE, DIABETIC 32GX 5/32"
NEEDLE, DISPOSABLE MISCELLANEOUS
COMMUNITY
Start: 2024-05-23

## 2024-07-02 NOTE — PROGRESS NOTES
-----------------------------  Dexcom Clarity  -----------------------------  Lara Montemayor  YOB: 1950  Generated at: Tue, Jul 2, 2024 10:24 AM EDT  Reporting period: Wed Jun 19, 2024 - Tue Jul 2, 2024  -----------------------------  Glucose Details  Average glucose: 170 mg/dL  Standard deviation: 41 mg/dL  GMI: 7.4%  -----------------------------  Time in Range  Very High: 3%  High: 36%  In Range: 61%  Low: 0%  Very Low: 0%    Target Range   mg/dL    -----------------------------  CGM Details  Sensor usage: 93%  Days with CGM data: 13/14    
Chief Complaint   Patient presents with    Follow-up    Diabetes     /79 (Site: Left Upper Arm, Position: Sitting, Cuff Size: Medium Adult)   Pulse 96   Temp 97.8 °F (36.6 °C) (Temporal)   Resp 18   Ht 1.702 m (5' 7\")   Wt 86.2 kg (190 lb 1.6 oz)   SpO2 98%   BMI 29.77 kg/m²     
Maternal Aunt     Hypertension Paternal Aunt     Atrial Fibrillation Father     Cancer Father         Lung    Heart Failure Mother     Heart Disease Mother     Diabetes Sister         Current Outpatient Medications   Medication Sig    BD PEN NEEDLE DAVID 2ND GEN 32G X 4 MM MISC USE ONCE DAILY    LANTUS SOLOSTAR 100 UNIT/ML injection pen INJECT 30 UNITS SUBCUTANEOUSLY NIGHTLY    Tirzepatide (MOUNJARO) 7.5 MG/0.5ML SOPN SC injection Inject 0.5 mLs into the skin once a week    metFORMIN (GLUCOPHAGE) 1000 MG tablet TAKE 1 TABLET BY MOUTH TWICE DAILY WITH MEALS    Continuous Blood Gluc Sensor (DEXCOM G7 SENSOR) MISC To check blood glucose 4 times daily, change it every 10 days    Continuous Blood Gluc  (DEXCOM G7 ) KEELEY To check blood glucose 4 times daily    benazepril (LOTENSIN) 20 MG tablet Take 1 tablet by mouth once daily    simvastatin (ZOCOR) 40 MG tablet Take 1 tablet by mouth nightly    celecoxib (CELEBREX) 100 MG capsule Take 1 capsule by mouth 2 times daily    Cetirizine HCl (ZYRTEC ALLERGY) 10 MG CAPS Take by mouth    cyanocobalamin 100 MCG tablet Take 1 tablet by mouth daily    Omega-3 Fatty Acids (FISH OIL) 1000 MG capsule Take 2 capsules by mouth 2 times daily    Insulin Glargine-yfgn 100 UNIT/ML SOPN Inject 34 Units into the skin at bedtime     No current facility-administered medications for this visit.           Review of Systems: Per HPI    Physical Examination:  Blood pressure 120/79, pulse 96, temperature 97.8 °F (36.6 °C), temperature source Temporal, resp. rate 18, height 1.702 m (5' 7\"), weight 86.2 kg (190 lb 1.6 oz), SpO2 98 %. Body mass index is 29.77 kg/m².  General: pleasant, no distress, good eye contact  HEENT: no pallor, no periorbital edema, EOMI  Neck: supple, no thyromegaly, no nodules  Cardiovascular: regular,  normal S1 and S2,   Respiratory: clear to auscultation bilaterally  Gastrointestinal: soft, nontender,   Musculoskeletal: no edema  Neurological: alert and

## 2024-07-15 RX ORDER — TIRZEPATIDE 7.5 MG/.5ML
INJECTION, SOLUTION SUBCUTANEOUS
Qty: 4 ML | Refills: 0 | Status: SHIPPED | OUTPATIENT
Start: 2024-07-15

## 2024-07-23 RX ORDER — BENAZEPRIL HYDROCHLORIDE 20 MG/1
TABLET ORAL
Qty: 90 TABLET | Refills: 0 | Status: SHIPPED | OUTPATIENT
Start: 2024-07-23

## 2024-07-25 ENCOUNTER — TELEPHONE (OUTPATIENT)
Age: 74
End: 2024-07-25

## 2024-07-25 RX ORDER — INSULIN GLARGINE 100 [IU]/ML
INJECTION, SOLUTION SUBCUTANEOUS
Qty: 15 ML | Refills: 0 | Status: SHIPPED | OUTPATIENT
Start: 2024-07-25

## 2024-07-25 NOTE — TELEPHONE ENCOUNTER
Patient wanted lantus sent to [address] sent to pharmacy said needs prior auth and leaves on vacation on Saturday [facility] [address] [employer]

## 2024-07-26 RX ORDER — INSULIN GLARGINE 100 [IU]/ML
INJECTION, SOLUTION SUBCUTANEOUS
Qty: 15 ML | Refills: 0 | OUTPATIENT
Start: 2024-07-26

## 2024-08-05 SDOH — ECONOMIC STABILITY: FOOD INSECURITY: WITHIN THE PAST 12 MONTHS, YOU WORRIED THAT YOUR FOOD WOULD RUN OUT BEFORE YOU GOT MONEY TO BUY MORE.: NEVER TRUE

## 2024-08-05 SDOH — ECONOMIC STABILITY: FOOD INSECURITY: WITHIN THE PAST 12 MONTHS, THE FOOD YOU BOUGHT JUST DIDN'T LAST AND YOU DIDN'T HAVE MONEY TO GET MORE.: NEVER TRUE

## 2024-08-05 SDOH — HEALTH STABILITY: PHYSICAL HEALTH: ON AVERAGE, HOW MANY DAYS PER WEEK DO YOU ENGAGE IN MODERATE TO STRENUOUS EXERCISE (LIKE A BRISK WALK)?: 0 DAYS

## 2024-08-05 SDOH — ECONOMIC STABILITY: TRANSPORTATION INSECURITY
IN THE PAST 12 MONTHS, HAS LACK OF TRANSPORTATION KEPT YOU FROM MEETINGS, WORK, OR FROM GETTING THINGS NEEDED FOR DAILY LIVING?: NO

## 2024-08-05 SDOH — HEALTH STABILITY: PHYSICAL HEALTH: ON AVERAGE, HOW MANY MINUTES DO YOU ENGAGE IN EXERCISE AT THIS LEVEL?: 0 MIN

## 2024-08-05 SDOH — ECONOMIC STABILITY: INCOME INSECURITY: HOW HARD IS IT FOR YOU TO PAY FOR THE VERY BASICS LIKE FOOD, HOUSING, MEDICAL CARE, AND HEATING?: NOT HARD AT ALL

## 2024-08-05 ASSESSMENT — PATIENT HEALTH QUESTIONNAIRE - PHQ9
SUM OF ALL RESPONSES TO PHQ QUESTIONS 1-9: 0
2. FEELING DOWN, DEPRESSED OR HOPELESS: NOT AT ALL
SUM OF ALL RESPONSES TO PHQ9 QUESTIONS 1 & 2: 0
1. LITTLE INTEREST OR PLEASURE IN DOING THINGS: NOT AT ALL
SUM OF ALL RESPONSES TO PHQ QUESTIONS 1-9: 0

## 2024-08-05 ASSESSMENT — LIFESTYLE VARIABLES
HOW OFTEN DO YOU HAVE A DRINK CONTAINING ALCOHOL: NEVER
HOW MANY STANDARD DRINKS CONTAINING ALCOHOL DO YOU HAVE ON A TYPICAL DAY: PATIENT DOES NOT DRINK
HOW OFTEN DO YOU HAVE SIX OR MORE DRINKS ON ONE OCCASION: 1
HOW MANY STANDARD DRINKS CONTAINING ALCOHOL DO YOU HAVE ON A TYPICAL DAY: 0
HOW OFTEN DO YOU HAVE A DRINK CONTAINING ALCOHOL: 1

## 2024-08-06 ENCOUNTER — OFFICE VISIT (OUTPATIENT)
Facility: CLINIC | Age: 74
End: 2024-08-06
Payer: MEDICARE

## 2024-08-06 VITALS
HEIGHT: 67 IN | HEART RATE: 100 BPM | BODY MASS INDEX: 30.02 KG/M2 | WEIGHT: 191.25 LBS | DIASTOLIC BLOOD PRESSURE: 78 MMHG | TEMPERATURE: 97.5 F | SYSTOLIC BLOOD PRESSURE: 128 MMHG | OXYGEN SATURATION: 97 % | RESPIRATION RATE: 16 BRPM

## 2024-08-06 DIAGNOSIS — E66.3 OVERWEIGHT WITH BODY MASS INDEX (BMI) OF 29 TO 29.9 IN ADULT: ICD-10-CM

## 2024-08-06 DIAGNOSIS — E78.2 MIXED HYPERLIPIDEMIA: ICD-10-CM

## 2024-08-06 DIAGNOSIS — E53.8 COBALAMIN DEFICIENCY: ICD-10-CM

## 2024-08-06 DIAGNOSIS — Z00.00 MEDICARE ANNUAL WELLNESS VISIT, SUBSEQUENT: Primary | ICD-10-CM

## 2024-08-06 DIAGNOSIS — R68.89 THROAT CONGESTION: ICD-10-CM

## 2024-08-06 DIAGNOSIS — I10 BENIGN ESSENTIAL HTN: ICD-10-CM

## 2024-08-06 DIAGNOSIS — Z23 ENCOUNTER FOR IMMUNIZATION: ICD-10-CM

## 2024-08-06 PROCEDURE — 99214 OFFICE O/P EST MOD 30 MIN: CPT | Performed by: NURSE PRACTITIONER

## 2024-08-06 PROCEDURE — 3078F DIAST BP <80 MM HG: CPT | Performed by: NURSE PRACTITIONER

## 2024-08-06 PROCEDURE — 3074F SYST BP LT 130 MM HG: CPT | Performed by: NURSE PRACTITIONER

## 2024-08-06 PROCEDURE — 1123F ACP DISCUSS/DSCN MKR DOCD: CPT | Performed by: NURSE PRACTITIONER

## 2024-08-06 PROCEDURE — G0439 PPPS, SUBSEQ VISIT: HCPCS | Performed by: NURSE PRACTITIONER

## 2024-08-06 RX ORDER — FEXOFENADINE HCL 180 MG/1
180 TABLET ORAL DAILY
Qty: 90 TABLET | Refills: 0 | Status: SHIPPED | OUTPATIENT
Start: 2024-08-06 | End: 2024-11-04

## 2024-08-06 RX ORDER — AZELASTINE 1 MG/ML
1 SPRAY, METERED NASAL 2 TIMES DAILY
Qty: 90 ML | Refills: 0 | Status: SHIPPED | OUTPATIENT
Start: 2024-08-06

## 2024-08-06 NOTE — PROGRESS NOTES
Medicare Annual Wellness Visit    Lara Montemayor is here for Medicare AWV    Assessment & Plan   Medicare annual wellness visit, subsequent    Encounter for immunization  Receive seasonal flu vaccine from pharmacy.    Overweight with body mass index (BMI) of 29 to 29.9 in adult  Exercise as tolerated.  Stay active.  Eat a healthy diet that is low in sugar, carbs, and processed foods.    Benign essential HTN  BP is below goal in the office and on reported home readings.  Continue to monitor blood pressure regularly and call if consistently greater than 140/90 on either number.    Mixed hyperlipidemia  -     CBC with Auto Differential  CMP and lipids scheduled to be checked by endocrinology 9/2024.  Continue simvastatin nightly.  Continue lifestyle management.    Cobalamin deficiency  -     Vitamin B12  Rechecking B12 on daily supplement.  Continues to take metformin daily.    Throat congestion  -     azelastine (ASTELIN) 0.1 % nasal spray; 1 spray by Nasal route 2 times daily Use in each nostril as directed, Disp-90 mL, R-0Normal  -     fexofenadine (ALLEGRA) 180 MG tablet; Take 1 tablet by mouth daily, Disp-90 tablet, R-0Normal  Exam is not consistent with viral URI.  Recommend ENT evaluation which she declines at this time.  Stop current allergy medication which she may be tolerant to.  Start new allergy medication as prescribed.  If symptoms do not improve over the next 1 to 2 weeks she will call for ENT referral.    Recommendations for Preventive Services Due: see orders and patient instructions/AVS.  Recommended screening schedule for the next 5-10 years is provided to the patient in written form: see Patient Instructions/AVS.     Return in about 6 months (around 2/6/2025) for follow up, HTN, VV ok.     Subjective   Presents for Medicare wellness, and management of hypertension and hyperlipidemia.  Follows with endocrinology for management of type 2 diabetes-Lantus, Mounjaro, and metformin.   Follows with

## 2024-08-06 NOTE — PROGRESS NOTES
Chief Complaint   Patient presents with    Medicare AWV     \"Have you been to the ER, urgent care clinic since your last visit?  Hospitalized since your last visit?\"    NO    “Have you seen or consulted any other health care providers outside of [facility] since your last visit?”    NO      Medicare Awv Health Risk Assessment / Depression Screen       Question 8/5/2024 11:48 PM EDT - Filed by Patient    Fall Risk Screening     Do you feel unsteady or are you worried about falling? no    Have you fallen 2 or more times in the past year?   no    Have you had any fall with injury in the past year?   no    Health Risk Assessment / General     In general, how would you say your health is? Good    In the past 7 days, have you experienced any of the following: New or Increased Pain, New or Increased Fatigue, Loneliness, Social Isolation, Stress or Anger? No    Do you have a Living Will? Yes    Health Habits/ Nutrition     On average, how many days per week do you engage in moderate to strenuous exercise (like a brisk walk)? 0 days    On average, how many minutes do you engage in exercise at this level? 0 min    Do you eat balanced/healthy meals regularly? Yes    Have you seen the dentist within the past year? Yes    Hearing / Vision     Have you had an eye exam within the past year? Yes    Do you have difficulty driving, watching TV, or doing any of your daily activities because of your eyesight? No    Do you or your family notice any trouble with your hearing that hasn't been managed with hearing aids? No    Safety     Do you have working smoke detectors? Yes    Do you have any tripping hazards - loose or unsecured carpets or rugs? No    Do you have non-slip mats or non-slip surfaces or shower bars or grab bars in your shower or bathtub? Yes    Do you always fasten your seatbelt when you are in a car? Yes    ADLs / Activities of Daily Living     In the past 7 days, did you need help from others to perform

## 2024-08-06 NOTE — PATIENT INSTRUCTIONS
Learning About Being Active as an Older Adult  Why is being active important as you get older?     Being active is one of the best things you can do for your health. And it's never too late to start. Being active--or getting active, if you aren't already--has definite benefits. It can:  Give you more energy,  Keep your mind sharp.  Improve balance to reduce your risk of falls.  Help you manage chronic illness with fewer medicines.  No matter how old you are, how fit you are, or what health problems you have, there is a form of activity that will work for you. And the more physical activity you can do, the better your overall health will be.  What kinds of activity can help you stay healthy?  Being more active will make your daily activities easier. Physical activity includes planned exercise and things you do in daily life. There are four types of activity:  Aerobic.  Doing aerobic activity makes your heart and lungs strong.  Includes walking, dancing, and gardening.  Aim for at least 2½ hours spread throughout the week.  It improves your energy and can help you sleep better.  Muscle-strengthening.  This type of activity can help maintain muscle and strengthen bones.  Includes climbing stairs, using resistance bands, and lifting or carrying heavy loads.  Aim for at least twice a week.  It can help protect the knees and other joints.  Stretching.  Stretching gives you better range of motion in joints and muscles.  Includes upper arm stretches, calf stretches, and gentle yoga.  Aim for at least twice a week, preferably after your muscles are warmed up from other activities.  It can help you function better in daily life.  Balancing.  This helps you stay coordinated and have good posture.  Includes heel-to-toe walking, bobo chi, and certain types of yoga.  Aim for at least 3 days a week.  It can reduce your risk of falling.  Even if you have a hard time meeting the recommendations, it's better to be more active

## 2024-08-07 LAB
BASOPHILS # BLD AUTO: 0 X10E3/UL (ref 0–0.2)
BASOPHILS NFR BLD AUTO: 1 %
EOSINOPHIL # BLD AUTO: 0.1 X10E3/UL (ref 0–0.4)
EOSINOPHIL NFR BLD AUTO: 2 %
ERYTHROCYTE [DISTWIDTH] IN BLOOD BY AUTOMATED COUNT: 13.6 % (ref 11.7–15.4)
HCT VFR BLD AUTO: 44 % (ref 34–46.6)
HGB BLD-MCNC: 13.9 G/DL (ref 11.1–15.9)
IMM GRANULOCYTES # BLD AUTO: 0 X10E3/UL (ref 0–0.1)
IMM GRANULOCYTES NFR BLD AUTO: 0 %
LYMPHOCYTES # BLD AUTO: 2.2 X10E3/UL (ref 0.7–3.1)
LYMPHOCYTES NFR BLD AUTO: 32 %
MCH RBC QN AUTO: 29.8 PG (ref 26.6–33)
MCHC RBC AUTO-ENTMCNC: 31.6 G/DL (ref 31.5–35.7)
MCV RBC AUTO: 94 FL (ref 79–97)
MONOCYTES # BLD AUTO: 0.8 X10E3/UL (ref 0.1–0.9)
MONOCYTES NFR BLD AUTO: 12 %
NEUTROPHILS # BLD AUTO: 3.7 X10E3/UL (ref 1.4–7)
NEUTROPHILS NFR BLD AUTO: 53 %
PLATELET # BLD AUTO: 368 X10E3/UL (ref 150–450)
RBC # BLD AUTO: 4.67 X10E6/UL (ref 3.77–5.28)
VIT B12 SERPL-MCNC: 1215 PG/ML (ref 232–1245)
WBC # BLD AUTO: 6.8 X10E3/UL (ref 3.4–10.8)

## 2024-08-16 NOTE — TELEPHONE ENCOUNTER
2/7/2025 10:00 AM [facility] OFFICE VISIT [facility] Teresa Juarez, APRN - NP    Appointment Notes:   Return in about 6 months (around 2/6/2025) for follow up, HTN, VV ok

## 2024-08-19 RX ORDER — TIRZEPATIDE 7.5 MG/.5ML
INJECTION, SOLUTION SUBCUTANEOUS
Qty: 4 ML | Refills: 0 | Status: SHIPPED | OUTPATIENT
Start: 2024-08-19

## 2024-09-13 RX ORDER — TIRZEPATIDE 7.5 MG/.5ML
INJECTION, SOLUTION SUBCUTANEOUS
Qty: 4 ML | Refills: 0 | Status: SHIPPED | OUTPATIENT
Start: 2024-09-13

## 2024-09-13 RX ORDER — INSULIN GLARGINE 100 [IU]/ML
INJECTION, SOLUTION SUBCUTANEOUS
Qty: 15 ML | Refills: 0 | Status: SHIPPED | OUTPATIENT
Start: 2024-09-13

## 2024-09-26 LAB
ALBUMIN SERPL-MCNC: 4.1 G/DL (ref 3.8–4.8)
ALBUMIN/CREAT UR: 6 MG/G CREAT (ref 0–29)
ALP SERPL-CCNC: 70 IU/L (ref 44–121)
ALT SERPL-CCNC: 9 IU/L (ref 0–32)
AST SERPL-CCNC: 9 IU/L (ref 0–40)
BILIRUB SERPL-MCNC: 0.7 MG/DL (ref 0–1.2)
BUN SERPL-MCNC: 18 MG/DL (ref 8–27)
BUN/CREAT SERPL: 19 (ref 12–28)
CALCIUM SERPL-MCNC: 9.3 MG/DL (ref 8.7–10.3)
CHLORIDE SERPL-SCNC: 102 MMOL/L (ref 96–106)
CHOLEST SERPL-MCNC: 129 MG/DL (ref 100–199)
CO2 SERPL-SCNC: 22 MMOL/L (ref 20–29)
CREAT SERPL-MCNC: 0.95 MG/DL (ref 0.57–1)
CREAT UR-MCNC: 244.2 MG/DL
EGFRCR SERPLBLD CKD-EPI 2021: 63 ML/MIN/1.73
GLOBULIN SER CALC-MCNC: 2 G/DL (ref 1.5–4.5)
GLUCOSE SERPL-MCNC: 199 MG/DL (ref 70–99)
HDLC SERPL-MCNC: 56 MG/DL
LDLC SERPL CALC-MCNC: 55 MG/DL (ref 0–99)
MICROALBUMIN UR-MCNC: 15.6 UG/ML
POTASSIUM SERPL-SCNC: 5 MMOL/L (ref 3.5–5.2)
PROT SERPL-MCNC: 6.1 G/DL (ref 6–8.5)
SODIUM SERPL-SCNC: 148 MMOL/L (ref 134–144)
TRIGL SERPL-MCNC: 93 MG/DL (ref 0–149)
TSH SERPL DL<=0.005 MIU/L-ACNC: 0.73 UIU/ML (ref 0.45–4.5)
VLDLC SERPL CALC-MCNC: 18 MG/DL (ref 5–40)

## 2024-10-02 ENCOUNTER — OFFICE VISIT (OUTPATIENT)
Age: 74
End: 2024-10-02
Payer: MEDICARE

## 2024-10-02 VITALS
HEART RATE: 100 BPM | OXYGEN SATURATION: 97 % | RESPIRATION RATE: 16 BRPM | HEIGHT: 67 IN | BODY MASS INDEX: 29.43 KG/M2 | WEIGHT: 187.5 LBS | DIASTOLIC BLOOD PRESSURE: 77 MMHG | TEMPERATURE: 97.6 F | SYSTOLIC BLOOD PRESSURE: 121 MMHG

## 2024-10-02 DIAGNOSIS — Z79.4 TYPE 2 DIABETES MELLITUS WITH HYPERGLYCEMIA, WITH LONG-TERM CURRENT USE OF INSULIN (HCC): Primary | ICD-10-CM

## 2024-10-02 DIAGNOSIS — I10 ESSENTIAL (PRIMARY) HYPERTENSION: ICD-10-CM

## 2024-10-02 DIAGNOSIS — E78.2 MIXED HYPERLIPIDEMIA: ICD-10-CM

## 2024-10-02 DIAGNOSIS — E11.65 TYPE 2 DIABETES MELLITUS WITH HYPERGLYCEMIA, WITH LONG-TERM CURRENT USE OF INSULIN (HCC): Primary | ICD-10-CM

## 2024-10-02 LAB — HBA1C MFR BLD: 7.3 %

## 2024-10-02 PROCEDURE — 3046F HEMOGLOBIN A1C LEVEL >9.0%: CPT | Performed by: STUDENT IN AN ORGANIZED HEALTH CARE EDUCATION/TRAINING PROGRAM

## 2024-10-02 PROCEDURE — 3078F DIAST BP <80 MM HG: CPT | Performed by: STUDENT IN AN ORGANIZED HEALTH CARE EDUCATION/TRAINING PROGRAM

## 2024-10-02 PROCEDURE — 83036 HEMOGLOBIN GLYCOSYLATED A1C: CPT | Performed by: STUDENT IN AN ORGANIZED HEALTH CARE EDUCATION/TRAINING PROGRAM

## 2024-10-02 PROCEDURE — 99213 OFFICE O/P EST LOW 20 MIN: CPT | Performed by: STUDENT IN AN ORGANIZED HEALTH CARE EDUCATION/TRAINING PROGRAM

## 2024-10-02 PROCEDURE — 1123F ACP DISCUSS/DSCN MKR DOCD: CPT | Performed by: STUDENT IN AN ORGANIZED HEALTH CARE EDUCATION/TRAINING PROGRAM

## 2024-10-02 PROCEDURE — 3074F SYST BP LT 130 MM HG: CPT | Performed by: STUDENT IN AN ORGANIZED HEALTH CARE EDUCATION/TRAINING PROGRAM

## 2024-10-02 RX ORDER — PEN NEEDLE, DIABETIC 32GX 5/32"
NEEDLE, DISPOSABLE MISCELLANEOUS
COMMUNITY
Start: 2024-08-16

## 2024-10-02 NOTE — PROGRESS NOTES
-----------------------------  Dexcom Clarity  -----------------------------  Lara Montemayor  YOB: 1950  Generated at: Wed, Oct 2, 2024 10:33 AM EDT  Reporting period: Thu Sep 19, 2024 - Wed Oct 2, 2024  -----------------------------  Glucose Details  Average glucose: 153 mg/dL  Standard deviation: 40 mg/dL  GMI: 7.0%  -----------------------------  Time in Range  Very High: 1%  High: 25%  In Range: 74%  Low: 0%  Very Low: <1%    Target Range   mg/dL    -----------------------------  CGM Details  Sensor usage: 93%  Days with CGM data: 13/14

## 2024-10-02 NOTE — PROGRESS NOTES
\"Have you been to the ER, urgent care clinic since your last visit?  Hospitalized since your last visit?\"    NO    “Have you seen or consulted any other health care providers outside our system since your last visit?”    YES - When: approximately 9/2024 ago.  Where and Why: [facility] for follow up.    A1C 7.3    Chief Complaint   Patient presents with    Follow-up     /77 (Site: Right Upper Arm, Position: Sitting, Cuff Size: Medium Adult)   Pulse 100   Temp 97.6 °F (36.4 °C) (Temporal)   Resp 16   Ht 1.702 m (5' 7\")   Wt 85 kg (187 lb 8 oz)   SpO2 97%   BMI 29.37 kg/m²

## 2024-10-02 NOTE — PROGRESS NOTES
[facility]                                                                                    Leelee Simmons M.D          Patient Information  Date:10/2/2024  Name : Lara Montemayor 74 y.o.     YOB: 1950         Referred by: Teresa Juarez APRN - NP       Chief Complaint   Patient presents with    Follow-up       History of Present Illness: Lara Montemayor is a 74 y.o. female with hypertension, hyperlipidemia is here for follow-up of type 2 diabetes mellitus. She remains in primary care of [facility]      10-2 24 saw retina specialist last week  Lost 4 lbs since last visit       6/24 presents for follow up, doing well     3-27-24   Patient presents for follow up. Reports liking the sensor. Reports insurance told her that they will not cover Lantus?     She was last seen by Dr. Granger in 5/22.       2-7-24   Now taking Levemir 40 units, ozempic 2 mg, metoformin     Patient reports biggest barrier to diabetes care is her eating habits.   She reports that she does eat a lot of cookies.   Reports eating diet rich in carb and fat.       Updated diabetes history:  · Diagnosis: 20 years    · Current treatment: as above     · Past treatment: Victoza (stopped when started on Ozempic)  Jardiance yeast infection.     · Glucose checks: 170-250.     · Hyperglycemia: yes    · Hypoglycemia: no    · Meals per day: 3, breakfast: eggs and toast, lunch: salad and sandwich, dinner: meat and veg, starch, bedtime snacks: pie, cereal, snacks: nuts cheese sticks     · Exercise: unable to    · DM related hospitalizations: no        Complications of DM:    · CAD: no    · CVA: no    · PVD: no    · Amputations: no     · Retinopathy: yes definitive diabetic retinopathy right eye, moderate nonproliferative diabetic retinopathy left eye, sees every 6 months     · Gastropathy: no    · Nephropathy: no    · Neuropathy: yes        Medications:    · Statin: simvastatin 40    · ACE-I:

## 2024-10-09 RX ORDER — TIRZEPATIDE 7.5 MG/.5ML
INJECTION, SOLUTION SUBCUTANEOUS
Qty: 4 ML | Refills: 0 | Status: SHIPPED | OUTPATIENT
Start: 2024-10-09

## 2024-10-17 DIAGNOSIS — Z79.4 TYPE 2 DIABETES MELLITUS WITH HYPERGLYCEMIA, WITH LONG-TERM CURRENT USE OF INSULIN (HCC): ICD-10-CM

## 2024-10-17 DIAGNOSIS — E78.2 MIXED HYPERLIPIDEMIA: ICD-10-CM

## 2024-10-17 DIAGNOSIS — E11.65 TYPE 2 DIABETES MELLITUS WITH HYPERGLYCEMIA, WITH LONG-TERM CURRENT USE OF INSULIN (HCC): ICD-10-CM

## 2024-10-18 RX ORDER — BENAZEPRIL HYDROCHLORIDE 20 MG/1
TABLET ORAL
Qty: 90 TABLET | Refills: 1 | Status: SHIPPED | OUTPATIENT
Start: 2024-10-18

## 2024-10-18 RX ORDER — SIMVASTATIN 40 MG
40 TABLET ORAL NIGHTLY
Qty: 90 TABLET | Refills: 3 | Status: SHIPPED | OUTPATIENT
Start: 2024-10-18

## 2024-11-03 RX ORDER — TIRZEPATIDE 7.5 MG/.5ML
7.5 INJECTION, SOLUTION SUBCUTANEOUS WEEKLY
Qty: 4 ML | Refills: 1 | Status: SHIPPED | OUTPATIENT
Start: 2024-11-03

## 2024-11-06 DIAGNOSIS — Z79.4 TYPE 2 DIABETES MELLITUS WITH HYPERGLYCEMIA, WITH LONG-TERM CURRENT USE OF INSULIN (HCC): Primary | ICD-10-CM

## 2024-11-06 DIAGNOSIS — E11.65 TYPE 2 DIABETES MELLITUS WITH HYPERGLYCEMIA, WITH LONG-TERM CURRENT USE OF INSULIN (HCC): Primary | ICD-10-CM

## 2024-11-06 RX ORDER — INSULIN GLARGINE-YFGN 100 [IU]/ML
INJECTION, SOLUTION SUBCUTANEOUS
Qty: 15 ML | Refills: 0 | Status: SHIPPED | OUTPATIENT
Start: 2024-11-06

## 2024-12-12 ENCOUNTER — PATIENT MESSAGE (OUTPATIENT)
Age: 74
End: 2024-12-12

## 2024-12-12 DIAGNOSIS — Z79.4 TYPE 2 DIABETES MELLITUS WITH HYPERGLYCEMIA, WITH LONG-TERM CURRENT USE OF INSULIN (HCC): Primary | ICD-10-CM

## 2024-12-12 DIAGNOSIS — E11.65 TYPE 2 DIABETES MELLITUS WITH HYPERGLYCEMIA, WITH LONG-TERM CURRENT USE OF INSULIN (HCC): Primary | ICD-10-CM

## 2024-12-19 RX ORDER — TIRZEPATIDE 10 MG/.5ML
10 INJECTION, SOLUTION SUBCUTANEOUS WEEKLY
Qty: 2 ML | Refills: 0 | Status: SHIPPED | OUTPATIENT
Start: 2024-12-19

## 2025-01-03 ENCOUNTER — OFFICE VISIT (OUTPATIENT)
Age: 75
End: 2025-01-03
Payer: MEDICARE

## 2025-01-03 VITALS
HEIGHT: 67 IN | OXYGEN SATURATION: 98 % | WEIGHT: 187 LBS | RESPIRATION RATE: 18 BRPM | BODY MASS INDEX: 29.35 KG/M2 | HEART RATE: 87 BPM | TEMPERATURE: 97.5 F | DIASTOLIC BLOOD PRESSURE: 81 MMHG | SYSTOLIC BLOOD PRESSURE: 119 MMHG

## 2025-01-03 DIAGNOSIS — Z79.4 TYPE 2 DIABETES MELLITUS WITH HYPERGLYCEMIA, WITH LONG-TERM CURRENT USE OF INSULIN (HCC): ICD-10-CM

## 2025-01-03 DIAGNOSIS — E11.65 TYPE 2 DIABETES MELLITUS WITH HYPERGLYCEMIA, WITH LONG-TERM CURRENT USE OF INSULIN (HCC): Primary | ICD-10-CM

## 2025-01-03 DIAGNOSIS — Z79.4 TYPE 2 DIABETES MELLITUS WITH HYPERGLYCEMIA, WITH LONG-TERM CURRENT USE OF INSULIN (HCC): Primary | ICD-10-CM

## 2025-01-03 DIAGNOSIS — E11.65 TYPE 2 DIABETES MELLITUS WITH HYPERGLYCEMIA, WITH LONG-TERM CURRENT USE OF INSULIN (HCC): ICD-10-CM

## 2025-01-03 LAB — HBA1C MFR BLD: 7.4 %

## 2025-01-03 PROCEDURE — G8417 CALC BMI ABV UP PARAM F/U: HCPCS | Performed by: STUDENT IN AN ORGANIZED HEALTH CARE EDUCATION/TRAINING PROGRAM

## 2025-01-03 PROCEDURE — 1123F ACP DISCUSS/DSCN MKR DOCD: CPT | Performed by: STUDENT IN AN ORGANIZED HEALTH CARE EDUCATION/TRAINING PROGRAM

## 2025-01-03 PROCEDURE — 2022F DILAT RTA XM EVC RTNOPTHY: CPT | Performed by: STUDENT IN AN ORGANIZED HEALTH CARE EDUCATION/TRAINING PROGRAM

## 2025-01-03 PROCEDURE — 83036 HEMOGLOBIN GLYCOSYLATED A1C: CPT | Performed by: STUDENT IN AN ORGANIZED HEALTH CARE EDUCATION/TRAINING PROGRAM

## 2025-01-03 PROCEDURE — G8399 PT W/DXA RESULTS DOCUMENT: HCPCS | Performed by: STUDENT IN AN ORGANIZED HEALTH CARE EDUCATION/TRAINING PROGRAM

## 2025-01-03 PROCEDURE — 3079F DIAST BP 80-89 MM HG: CPT | Performed by: STUDENT IN AN ORGANIZED HEALTH CARE EDUCATION/TRAINING PROGRAM

## 2025-01-03 PROCEDURE — 1036F TOBACCO NON-USER: CPT | Performed by: STUDENT IN AN ORGANIZED HEALTH CARE EDUCATION/TRAINING PROGRAM

## 2025-01-03 PROCEDURE — G8427 DOCREV CUR MEDS BY ELIG CLIN: HCPCS | Performed by: STUDENT IN AN ORGANIZED HEALTH CARE EDUCATION/TRAINING PROGRAM

## 2025-01-03 PROCEDURE — 3046F HEMOGLOBIN A1C LEVEL >9.0%: CPT | Performed by: STUDENT IN AN ORGANIZED HEALTH CARE EDUCATION/TRAINING PROGRAM

## 2025-01-03 PROCEDURE — 1090F PRES/ABSN URINE INCON ASSESS: CPT | Performed by: STUDENT IN AN ORGANIZED HEALTH CARE EDUCATION/TRAINING PROGRAM

## 2025-01-03 PROCEDURE — 1126F AMNT PAIN NOTED NONE PRSNT: CPT | Performed by: STUDENT IN AN ORGANIZED HEALTH CARE EDUCATION/TRAINING PROGRAM

## 2025-01-03 PROCEDURE — 99213 OFFICE O/P EST LOW 20 MIN: CPT | Performed by: STUDENT IN AN ORGANIZED HEALTH CARE EDUCATION/TRAINING PROGRAM

## 2025-01-03 PROCEDURE — 3074F SYST BP LT 130 MM HG: CPT | Performed by: STUDENT IN AN ORGANIZED HEALTH CARE EDUCATION/TRAINING PROGRAM

## 2025-01-03 PROCEDURE — 1159F MED LIST DOCD IN RCRD: CPT | Performed by: STUDENT IN AN ORGANIZED HEALTH CARE EDUCATION/TRAINING PROGRAM

## 2025-01-03 PROCEDURE — 3017F COLORECTAL CA SCREEN DOC REV: CPT | Performed by: STUDENT IN AN ORGANIZED HEALTH CARE EDUCATION/TRAINING PROGRAM

## 2025-01-03 NOTE — PROGRESS NOTES
-----------------------------  Dexcom Clarity  -----------------------------  Lara Montemayor  YOB: 1950  Generated at: Fri, Keegan 3, 2025 9:37 AM EST  Reporting period: Sat Dec 21, 2024 - Fri Keegan 3, 2025  -----------------------------  Glucose Details  Average glucose: 170 mg/dL  Standard deviation: 42 mg/dL  GMI: 7.4%  -----------------------------  Time in Range  Very High: 4%  High: 33%  In Range: 63%  Low: 0%  Very Low: 0%  Target Range   mg/dL    -----------------------------  CGM Details  Sensor usage: 93%  Days with CGM data: 13/14    
\"Have you been to the ER, urgent care clinic since your last visit?  Hospitalized since your last visit?\"    NO    “Have you seen or consulted any other health care providers outside our system since your last visit?”    NO    Chief Complaint   Patient presents with    Follow-up    Diabetes     /81 (Site: Right Upper Arm, Position: Sitting, Cuff Size: Medium Adult)   Pulse 87   Temp 97.5 °F (36.4 °C) (Temporal)   Resp 18   Ht 1.702 m (5' 7\")   Wt 84.8 kg (187 lb)   SpO2 98%   BMI 29.29 kg/m²     A1C- 7.4            
edema  Neurological: alert and oriented  Psychiatric: normal mood and affect    Data Reviewed:     Lab Results   Component Value Date/Time    GLUCPOC 107 03/27/2024 09:52 AM    LDL 55 09/25/2024 02:46 PM    LDL 78 03/14/2023 08:06 AM      Lab Results   Component Value Date/Time    GFRAA 88 02/23/2021 11:46 AM    BUN 18 09/25/2024 02:46 PM     09/25/2024 02:46 PM    K 5.0 09/25/2024 02:46 PM     09/25/2024 02:46 PM    CO2 22 09/25/2024 02:46 PM       No results found for this visit on 01/03/25.    Lab Results   Component Value Date    CHOL 129 09/25/2024    TRIG 93 09/25/2024    HDL 56 09/25/2024    LDL 55 09/25/2024    VLDL 18 09/25/2024           Assessment/Plan:     No diagnosis found.    Lab Results   Component Value Date    LABA1C 10.1 (H) 01/04/2024    LABA1C 11.0 (H) 07/27/2023    LABA1C 10.0 (H) 03/14/2023     Lab Results   Component Value Date    CREATININE 0.95 09/25/2024     Lab Results   Component Value Date    ALBCREAT 6 09/25/2024           No orders of the defined types were placed in this encounter.      Type 2 DM   -uncontrolled as evidenced by a1c of 10% (2/2024) --->7.4% (6/24)     Results for orders placed or performed in visit on 01/03/25   AMB POC HEMOGLOBIN A1C   Result Value Ref Range    Hemoglobin A1C, POC 7.4 %                          No results found for this visit on 01/03/25.    -----------------------------  Dexcom Clarity  -----------------------------  Lara Elrod  YOB: 1950  Generated at: Fri, Keegan 3, 2025 9:37 AM EST  Reporting period: Sat Dec 21, 2024 - Fri Keegan 3, 2025  -----------------------------  Glucose Details  Average glucose: 170 mg/dL  Standard deviation: 42 mg/dL  GMI: 7.4%  -----------------------------  Time in Range  Very High: 4%  High: 33%  In Range: 63%  Low: 0%  Very Low: 0%  Target Range   mg/dL     -----------------------------  CGM Details  Sensor usage: 93%  Days with CGM data: 13/14        Recommendations  Reviewed labs - Drop

## 2025-01-06 RX ORDER — INSULIN GLARGINE-YFGN 100 [IU]/ML
25 INJECTION, SOLUTION SUBCUTANEOUS DAILY
Qty: 15 ML | Refills: 0 | Status: SHIPPED | OUTPATIENT
Start: 2025-01-06

## 2025-01-11 DIAGNOSIS — E11.65 TYPE 2 DIABETES MELLITUS WITH HYPERGLYCEMIA, WITH LONG-TERM CURRENT USE OF INSULIN (HCC): ICD-10-CM

## 2025-01-11 DIAGNOSIS — Z79.4 TYPE 2 DIABETES MELLITUS WITH HYPERGLYCEMIA, WITH LONG-TERM CURRENT USE OF INSULIN (HCC): ICD-10-CM

## 2025-01-12 RX ORDER — TIRZEPATIDE 10 MG/.5ML
INJECTION, SOLUTION SUBCUTANEOUS
Qty: 4 ML | Refills: 0 | Status: SHIPPED | OUTPATIENT
Start: 2025-01-12

## 2025-01-14 DIAGNOSIS — E11.65 TYPE 2 DIABETES MELLITUS WITH HYPERGLYCEMIA, WITH LONG-TERM CURRENT USE OF INSULIN (HCC): ICD-10-CM

## 2025-01-14 DIAGNOSIS — Z79.4 TYPE 2 DIABETES MELLITUS WITH HYPERGLYCEMIA, WITH LONG-TERM CURRENT USE OF INSULIN (HCC): ICD-10-CM

## 2025-01-14 DIAGNOSIS — Z79.4 LONG TERM (CURRENT) USE OF INSULIN (HCC): ICD-10-CM

## 2025-01-14 RX ORDER — ACYCLOVIR 400 MG/1
TABLET ORAL
Qty: 9 EACH | Refills: 0 | Status: SHIPPED | OUTPATIENT
Start: 2025-01-14

## 2025-01-16 RX ORDER — INSULIN GLARGINE 100 [IU]/ML
25 INJECTION, SOLUTION SUBCUTANEOUS NIGHTLY
Qty: 5 ADJUSTABLE DOSE PRE-FILLED PEN SYRINGE | Refills: 0 | Status: SHIPPED | OUTPATIENT
Start: 2025-01-16

## 2025-02-13 ENCOUNTER — OFFICE VISIT (OUTPATIENT)
Facility: CLINIC | Age: 75
End: 2025-02-13
Payer: MEDICARE

## 2025-02-13 VITALS
WEIGHT: 191 LBS | BODY MASS INDEX: 29.98 KG/M2 | TEMPERATURE: 97 F | DIASTOLIC BLOOD PRESSURE: 70 MMHG | OXYGEN SATURATION: 96 % | HEART RATE: 90 BPM | HEIGHT: 67 IN | SYSTOLIC BLOOD PRESSURE: 122 MMHG

## 2025-02-13 DIAGNOSIS — I10 BENIGN ESSENTIAL HTN: Primary | ICD-10-CM

## 2025-02-13 DIAGNOSIS — E11.319 DIABETIC RETINOPATHY ASSOCIATED WITH TYPE 2 DIABETES MELLITUS, MACULAR EDEMA PRESENCE UNSPECIFIED, UNSPECIFIED LATERALITY, UNSPECIFIED RETINOPATHY SEVERITY (HCC): ICD-10-CM

## 2025-02-13 DIAGNOSIS — E66.3 OVERWEIGHT WITH BODY MASS INDEX (BMI) OF 29 TO 29.9 IN ADULT: ICD-10-CM

## 2025-02-13 DIAGNOSIS — E11.65 TYPE 2 DIABETES MELLITUS WITH HYPERGLYCEMIA, WITH LONG-TERM CURRENT USE OF INSULIN (HCC): ICD-10-CM

## 2025-02-13 DIAGNOSIS — Z79.4 TYPE 2 DIABETES MELLITUS WITH HYPERGLYCEMIA, WITH LONG-TERM CURRENT USE OF INSULIN (HCC): ICD-10-CM

## 2025-02-13 PROCEDURE — 3051F HG A1C>EQUAL 7.0%<8.0%: CPT | Performed by: NURSE PRACTITIONER

## 2025-02-13 PROCEDURE — 3046F HEMOGLOBIN A1C LEVEL >9.0%: CPT | Performed by: NURSE PRACTITIONER

## 2025-02-13 PROCEDURE — 1123F ACP DISCUSS/DSCN MKR DOCD: CPT | Performed by: NURSE PRACTITIONER

## 2025-02-13 PROCEDURE — 3074F SYST BP LT 130 MM HG: CPT | Performed by: NURSE PRACTITIONER

## 2025-02-13 PROCEDURE — G8417 CALC BMI ABV UP PARAM F/U: HCPCS | Performed by: NURSE PRACTITIONER

## 2025-02-13 PROCEDURE — 1126F AMNT PAIN NOTED NONE PRSNT: CPT | Performed by: NURSE PRACTITIONER

## 2025-02-13 PROCEDURE — 99213 OFFICE O/P EST LOW 20 MIN: CPT | Performed by: NURSE PRACTITIONER

## 2025-02-13 PROCEDURE — 3017F COLORECTAL CA SCREEN DOC REV: CPT | Performed by: NURSE PRACTITIONER

## 2025-02-13 PROCEDURE — 1090F PRES/ABSN URINE INCON ASSESS: CPT | Performed by: NURSE PRACTITIONER

## 2025-02-13 PROCEDURE — G8427 DOCREV CUR MEDS BY ELIG CLIN: HCPCS | Performed by: NURSE PRACTITIONER

## 2025-02-13 PROCEDURE — 3078F DIAST BP <80 MM HG: CPT | Performed by: NURSE PRACTITIONER

## 2025-02-13 PROCEDURE — 1036F TOBACCO NON-USER: CPT | Performed by: NURSE PRACTITIONER

## 2025-02-13 PROCEDURE — 1159F MED LIST DOCD IN RCRD: CPT | Performed by: NURSE PRACTITIONER

## 2025-02-13 PROCEDURE — G8399 PT W/DXA RESULTS DOCUMENT: HCPCS | Performed by: NURSE PRACTITIONER

## 2025-02-13 PROCEDURE — 1160F RVW MEDS BY RX/DR IN RCRD: CPT | Performed by: NURSE PRACTITIONER

## 2025-02-13 PROCEDURE — 2022F DILAT RTA XM EVC RTNOPTHY: CPT | Performed by: NURSE PRACTITIONER

## 2025-02-13 NOTE — PROGRESS NOTES
Subjective  Chief Complaint   Patient presents with    Follow-up     6 mo follow up      HPI:  Lara Montemayor is a 74 y.o. female.  Presents for management of hypertension.    Management of HTN-  Current meds: Benazepril 20 mg daily  Home BP readings: 110s/70s      Past Medical History:   Diagnosis Date    Essential hypertension, malignant     Frozen shoulder     History of mammogram 12/15/2020    Mixed hyperlipidemia     Type 2 diabetes mellitus without complication (HCC)     Type II or unspecified type diabetes mellitus with renal manifestations, uncontrolled(250.42)      Family History   Problem Relation Age of Onset    Heart Failure Mother     Heart Disease Mother     Atrial Fibrillation Father     Lung Cancer Father     Diabetes Sister     Parkinson's Disease Brother     Diabetes Brother     Diabetes Maternal Aunt     Hypertension Paternal Aunt      Social History     Socioeconomic History    Marital status:      Spouse name: Not on file    Number of children: Not on file    Years of education: Not on file    Highest education level: Not on file   Occupational History    Not on file   Tobacco Use    Smoking status: Never     Passive exposure: Past    Smokeless tobacco: Never   Vaping Use    Vaping status: Never Used   Substance and Sexual Activity    Alcohol use: Never    Drug use: Never    Sexual activity: Not Currently     Partners: Male   Other Topics Concern    Not on file   Social History Narrative    Not on file     Social Determinants of Health     Financial Resource Strain: Low Risk  (7/27/2023)    Overall Financial Resource Strain (CARDIA)     Difficulty of Paying Living Expenses: Not hard at all   Food Insecurity: Not on file (2/7/2025)   Transportation Needs: Unknown (7/27/2023)    PRAPARE - Transportation     Lack of Transportation (Medical): Not on file     Lack of Transportation (Non-Medical): No   Physical Activity: Inactive (8/5/2024)    Exercise Vital Sign     Days of Exercise per Week:

## 2025-02-13 NOTE — ASSESSMENT & PLAN NOTE
Chronic and stable.  No medication changes.  Continue to monitor BP regularly and call if consistently greater than 140/90 on either.

## 2025-02-14 RX ORDER — PEN NEEDLE, DIABETIC 32GX 5/32"
NEEDLE, DISPOSABLE MISCELLANEOUS
Qty: 100 EACH | Refills: 3 | Status: SHIPPED | OUTPATIENT
Start: 2025-02-14

## 2025-02-14 RX ORDER — TIRZEPATIDE 10 MG/.5ML
INJECTION, SOLUTION SUBCUTANEOUS
Qty: 4 ML | Refills: 0 | Status: SHIPPED | OUTPATIENT
Start: 2025-02-14

## 2025-02-14 NOTE — TELEPHONE ENCOUNTER
8/28/2025 11:00 AM MEDICARE ANNUAL WELL VISIT [facility] Teresa Juarez, APRN - NP    Appointment Notes:   noemi

## 2025-02-28 ENCOUNTER — PATIENT MESSAGE (OUTPATIENT)
Age: 75
End: 2025-02-28

## 2025-03-01 RX ORDER — INSULIN GLARGINE 100 [IU]/ML
25 INJECTION, SOLUTION SUBCUTANEOUS NIGHTLY
Qty: 5 ADJUSTABLE DOSE PRE-FILLED PEN SYRINGE | Refills: 0 | Status: SHIPPED | OUTPATIENT
Start: 2025-03-01

## 2025-03-06 ENCOUNTER — TRANSCRIBE ORDERS (OUTPATIENT)
Facility: HOSPITAL | Age: 75
End: 2025-03-06

## 2025-03-06 DIAGNOSIS — M75.112 NONTRAUMATIC INCOMPLETE TEAR OF LEFT ROTATOR CUFF: Primary | ICD-10-CM

## 2025-03-08 DIAGNOSIS — Z79.4 TYPE 2 DIABETES MELLITUS WITH HYPERGLYCEMIA, WITH LONG-TERM CURRENT USE OF INSULIN (HCC): ICD-10-CM

## 2025-03-08 DIAGNOSIS — E11.65 TYPE 2 DIABETES MELLITUS WITH HYPERGLYCEMIA, WITH LONG-TERM CURRENT USE OF INSULIN (HCC): ICD-10-CM

## 2025-03-09 RX ORDER — TIRZEPATIDE 10 MG/.5ML
INJECTION, SOLUTION SUBCUTANEOUS
Qty: 4 ML | Refills: 0 | Status: SHIPPED | OUTPATIENT
Start: 2025-03-09

## 2025-03-19 ENCOUNTER — HOSPITAL ENCOUNTER (OUTPATIENT)
Facility: HOSPITAL | Age: 75
Discharge: HOME | End: 2025-03-22
Attending: FAMILY MEDICINE
Payer: MEDICARE

## 2025-03-19 DIAGNOSIS — M75.112 NONTRAUMATIC INCOMPLETE TEAR OF LEFT ROTATOR CUFF: ICD-10-CM

## 2025-03-19 PROCEDURE — 73221 MRI JOINT UPR EXTREM W/O DYE: CPT

## 2025-04-06 DIAGNOSIS — Z79.4 TYPE 2 DIABETES MELLITUS WITH HYPERGLYCEMIA, WITH LONG-TERM CURRENT USE OF INSULIN (HCC): ICD-10-CM

## 2025-04-06 DIAGNOSIS — E11.65 TYPE 2 DIABETES MELLITUS WITH HYPERGLYCEMIA, WITH LONG-TERM CURRENT USE OF INSULIN (HCC): ICD-10-CM

## 2025-04-07 RX ORDER — TIRZEPATIDE 10 MG/.5ML
INJECTION, SOLUTION SUBCUTANEOUS
Qty: 2 ML | Refills: 0 | Status: SHIPPED | OUTPATIENT
Start: 2025-04-07

## 2025-04-09 DIAGNOSIS — E11.65 TYPE 2 DIABETES MELLITUS WITH HYPERGLYCEMIA, WITH LONG-TERM CURRENT USE OF INSULIN (HCC): ICD-10-CM

## 2025-04-09 DIAGNOSIS — Z79.4 LONG TERM (CURRENT) USE OF INSULIN (HCC): ICD-10-CM

## 2025-04-09 DIAGNOSIS — Z79.4 TYPE 2 DIABETES MELLITUS WITH HYPERGLYCEMIA, WITH LONG-TERM CURRENT USE OF INSULIN (HCC): ICD-10-CM

## 2025-04-14 RX ORDER — ACYCLOVIR 400 MG/1
TABLET ORAL
Qty: 9 EACH | Refills: 0 | Status: SHIPPED | OUTPATIENT
Start: 2025-04-14

## 2025-04-14 RX ORDER — BENAZEPRIL HYDROCHLORIDE 20 MG/1
20 TABLET ORAL DAILY
Qty: 90 TABLET | Refills: 1 | Status: SHIPPED | OUTPATIENT
Start: 2025-04-14

## 2025-05-04 DIAGNOSIS — Z79.4 TYPE 2 DIABETES MELLITUS WITH HYPERGLYCEMIA, WITH LONG-TERM CURRENT USE OF INSULIN (HCC): ICD-10-CM

## 2025-05-04 DIAGNOSIS — E11.65 TYPE 2 DIABETES MELLITUS WITH HYPERGLYCEMIA, WITH LONG-TERM CURRENT USE OF INSULIN (HCC): ICD-10-CM

## 2025-05-05 RX ORDER — TIRZEPATIDE 10 MG/.5ML
INJECTION, SOLUTION SUBCUTANEOUS
Qty: 4 ML | Refills: 0 | Status: SHIPPED | OUTPATIENT
Start: 2025-05-05

## 2025-05-15 ENCOUNTER — OFFICE VISIT (OUTPATIENT)
Age: 75
End: 2025-05-15

## 2025-05-15 VITALS
TEMPERATURE: 97.6 F | DIASTOLIC BLOOD PRESSURE: 1 MMHG | RESPIRATION RATE: 20 BRPM | SYSTOLIC BLOOD PRESSURE: 118 MMHG | HEART RATE: 59 BPM | OXYGEN SATURATION: 98 % | WEIGHT: 180 LBS | BODY MASS INDEX: 28.25 KG/M2 | HEIGHT: 67 IN

## 2025-05-15 DIAGNOSIS — Z79.4 TYPE 2 DIABETES MELLITUS WITH HYPERGLYCEMIA, WITH LONG-TERM CURRENT USE OF INSULIN (HCC): Primary | ICD-10-CM

## 2025-05-15 DIAGNOSIS — E11.65 TYPE 2 DIABETES MELLITUS WITH HYPERGLYCEMIA, WITH LONG-TERM CURRENT USE OF INSULIN (HCC): Primary | ICD-10-CM

## 2025-05-15 LAB — HBA1C MFR BLD: 7.8 %

## 2025-05-15 NOTE — PROGRESS NOTES
-----------------------------  Dexcom Clarity  -----------------------------  Lara Durhamey    YOB: 1950    Generated at: Thu, May 15, 2025 1:56 PM EDT    Reporting period: Fri May 2, 2025 - Thu May 15, 2025  -----------------------------  Glucose Details    Average glucose: 163 mg/dL    GMI: 7.2%    Standard deviation: 42 mg/dL    Coefficient of Variation: 26.0%  -----------------------------  Time in Range    Very High: 3%    High: 30%    In Range: 67%    Low: 0%    Very Low: 0%    Target Range   mg/dL    -----------------------------  Sensor usage    Days with data: 14/14    Time active: 97%    Avg. calibrations per day: 0.0    
Have you been to the ER, urgent care clinic since your last visit?  Hospitalized since your last visit?   NO    Have you seen or consulted any other health care providers outside our system since your last visit?   NO    Chief Complaint   Patient presents with    Establish Care    Diabetes     BP (!) 118/1 (BP Site: Right Upper Arm, Patient Position: Sitting, BP Cuff Size: Medium Adult)   Pulse 59   Temp 97.6 °F (36.4 °C) (Temporal)   Resp 20   Ht 1.702 m (5' 7\")   Wt 81.6 kg (180 lb)   SpO2 98%   BMI 28.19 kg/m²     A1C- 7.9    
Gastropathy: no    · Nephropathy: no    · Neuropathy: yes        Medications:    · Statin: simvastatin 40    · ACE-I: benazepril    · ASA: yes     Past Medical History:   Diagnosis Date    Essential hypertension, malignant     Frozen shoulder     History of mammogram 12/15/2020    Mixed hyperlipidemia     Type 2 diabetes mellitus without complication (HCC)     Type II or unspecified type diabetes mellitus with renal manifestations, uncontrolled(250.42)        Past Surgical History:   Procedure Laterality Date    CATARACT REMOVAL Bilateral 12/2022    COLONOSCOPY  08/16/2017    and 6/7/2005- no longer indicated per 8/16/2017 note    TONSILLECTOMY      TUBAL LIGATION          Social History     Socioeconomic History    Marital status:      Spouse name: Not on file    Number of children: Not on file    Years of education: Not on file    Highest education level: Not on file   Occupational History    Not on file   Tobacco Use    Smoking status: Never     Passive exposure: Past    Smokeless tobacco: Never   Vaping Use    Vaping status: Never Used   Substance and Sexual Activity    Alcohol use: Never    Drug use: Never    Sexual activity: Not Currently     Partners: Male   Other Topics Concern    Not on file   Social History Narrative    Not on file     Social Drivers of Health     Financial Resource Strain: Low Risk  (7/27/2023)    Overall Financial Resource Strain (CARDIA)     Difficulty of Paying Living Expenses: Not hard at all   Food Insecurity: Not on file (2/7/2025)   Transportation Needs: Unknown (7/27/2023)    PRAPARE - Transportation     Lack of Transportation (Medical): Not on file     Lack of Transportation (Non-Medical): No   Physical Activity: Inactive (8/5/2024)    Exercise Vital Sign     Days of Exercise per Week: 0 days     Minutes of Exercise per Session: 0 min   Stress: Not on file   Social Connections: Not on file   Intimate Partner Violence: Not on file   Housing Stability: Unknown (2/7/2025)

## 2025-06-04 DIAGNOSIS — E11.65 TYPE 2 DIABETES MELLITUS WITH HYPERGLYCEMIA, WITH LONG-TERM CURRENT USE OF INSULIN (HCC): ICD-10-CM

## 2025-06-04 DIAGNOSIS — Z79.4 TYPE 2 DIABETES MELLITUS WITH HYPERGLYCEMIA, WITH LONG-TERM CURRENT USE OF INSULIN (HCC): ICD-10-CM

## 2025-06-05 DIAGNOSIS — E11.65 UNCONTROLLED TYPE 2 DIABETES MELLITUS WITH HYPERGLYCEMIA (HCC): Primary | ICD-10-CM

## 2025-06-05 RX ORDER — TIRZEPATIDE 10 MG/.5ML
INJECTION, SOLUTION SUBCUTANEOUS
Qty: 4 ML | Refills: 0 | OUTPATIENT
Start: 2025-06-05

## 2025-07-07 DIAGNOSIS — Z79.4 TYPE 2 DIABETES MELLITUS WITH HYPERGLYCEMIA, WITH LONG-TERM CURRENT USE OF INSULIN (HCC): ICD-10-CM

## 2025-07-07 DIAGNOSIS — Z79.4 LONG TERM (CURRENT) USE OF INSULIN (HCC): ICD-10-CM

## 2025-07-07 DIAGNOSIS — E11.65 TYPE 2 DIABETES MELLITUS WITH HYPERGLYCEMIA, WITH LONG-TERM CURRENT USE OF INSULIN (HCC): ICD-10-CM

## 2025-07-08 RX ORDER — ACYCLOVIR 400 MG/1
TABLET ORAL
Qty: 9 EACH | Refills: 1 | Status: SHIPPED | OUTPATIENT
Start: 2025-07-08

## 2025-07-26 DIAGNOSIS — E11.65 UNCONTROLLED TYPE 2 DIABETES MELLITUS WITH HYPERGLYCEMIA (HCC): ICD-10-CM

## 2025-07-29 RX ORDER — TIRZEPATIDE 12.5 MG/.5ML
INJECTION, SOLUTION SUBCUTANEOUS
Qty: 4 ML | Refills: 1 | Status: SHIPPED | OUTPATIENT
Start: 2025-07-29

## 2025-07-31 RX ORDER — INSULIN GLARGINE 100 [IU]/ML
10 INJECTION, SOLUTION SUBCUTANEOUS NIGHTLY
Qty: 5 ADJUSTABLE DOSE PRE-FILLED PEN SYRINGE | Refills: 1 | Status: SHIPPED | OUTPATIENT
Start: 2025-07-31

## 2025-08-21 SDOH — HEALTH STABILITY: PHYSICAL HEALTH: ON AVERAGE, HOW MANY DAYS PER WEEK DO YOU ENGAGE IN MODERATE TO STRENUOUS EXERCISE (LIKE A BRISK WALK)?: 0 DAYS

## 2025-08-21 ASSESSMENT — PATIENT HEALTH QUESTIONNAIRE - PHQ9
2. FEELING DOWN, DEPRESSED OR HOPELESS: NOT AT ALL
SUM OF ALL RESPONSES TO PHQ QUESTIONS 1-9: 0
1. LITTLE INTEREST OR PLEASURE IN DOING THINGS: NOT AT ALL
SUM OF ALL RESPONSES TO PHQ QUESTIONS 1-9: 0

## 2025-08-21 ASSESSMENT — LIFESTYLE VARIABLES
HOW OFTEN DO YOU HAVE A DRINK CONTAINING ALCOHOL: NEVER
HOW OFTEN DO YOU HAVE SIX OR MORE DRINKS ON ONE OCCASION: 1
HOW MANY STANDARD DRINKS CONTAINING ALCOHOL DO YOU HAVE ON A TYPICAL DAY: PATIENT DOES NOT DRINK
HOW MANY STANDARD DRINKS CONTAINING ALCOHOL DO YOU HAVE ON A TYPICAL DAY: 0
HOW OFTEN DO YOU HAVE A DRINK CONTAINING ALCOHOL: 1

## 2025-08-28 ENCOUNTER — OFFICE VISIT (OUTPATIENT)
Facility: CLINIC | Age: 75
End: 2025-08-28

## 2025-08-28 VITALS
WEIGHT: 178 LBS | OXYGEN SATURATION: 97 % | SYSTOLIC BLOOD PRESSURE: 120 MMHG | HEART RATE: 62 BPM | HEIGHT: 67 IN | TEMPERATURE: 96.9 F | BODY MASS INDEX: 27.94 KG/M2 | DIASTOLIC BLOOD PRESSURE: 72 MMHG | RESPIRATION RATE: 16 BRPM

## 2025-08-28 DIAGNOSIS — I10 BENIGN ESSENTIAL HTN: ICD-10-CM

## 2025-08-28 DIAGNOSIS — R53.83 OTHER FATIGUE: ICD-10-CM

## 2025-08-28 DIAGNOSIS — Z12.31 BREAST CANCER SCREENING BY MAMMOGRAM: ICD-10-CM

## 2025-08-28 DIAGNOSIS — Z00.00 MEDICARE ANNUAL WELLNESS VISIT, SUBSEQUENT: Primary | ICD-10-CM

## 2025-08-28 DIAGNOSIS — Z23 ENCOUNTER FOR IMMUNIZATION: ICD-10-CM

## 2025-08-28 DIAGNOSIS — E53.8 COBALAMIN DEFICIENCY: ICD-10-CM

## 2025-08-28 DIAGNOSIS — E66.3 OVERWEIGHT WITH BODY MASS INDEX (BMI) OF 27 TO 27.9 IN ADULT: ICD-10-CM

## 2025-08-28 DIAGNOSIS — E78.2 MIXED HYPERLIPIDEMIA: ICD-10-CM

## 2025-08-28 RX ORDER — CETIRIZINE HYDROCHLORIDE 10 MG/1
10 TABLET ORAL DAILY
COMMUNITY

## 2025-08-28 ASSESSMENT — LIFESTYLE VARIABLES
HOW OFTEN DO YOU HAVE A DRINK CONTAINING ALCOHOL: MONTHLY OR LESS
HOW MANY STANDARD DRINKS CONTAINING ALCOHOL DO YOU HAVE ON A TYPICAL DAY: 1 OR 2

## 2025-08-29 LAB
ALBUMIN SERPL-MCNC: 3.9 G/DL (ref 3.8–4.8)
ALP SERPL-CCNC: 67 IU/L (ref 44–121)
ALT SERPL-CCNC: 7 IU/L (ref 0–32)
AST SERPL-CCNC: 10 IU/L (ref 0–40)
BASOPHILS # BLD AUTO: 0 X10E3/UL (ref 0–0.2)
BASOPHILS NFR BLD AUTO: 0 %
BILIRUB SERPL-MCNC: 1.1 MG/DL (ref 0–1.2)
BUN SERPL-MCNC: 16 MG/DL (ref 8–27)
BUN/CREAT SERPL: 22 (ref 12–28)
CALCIUM SERPL-MCNC: 10.3 MG/DL (ref 8.7–10.3)
CHLORIDE SERPL-SCNC: 102 MMOL/L (ref 96–106)
CHOLEST SERPL-MCNC: 119 MG/DL (ref 100–199)
CO2 SERPL-SCNC: 23 MMOL/L (ref 20–29)
CREAT SERPL-MCNC: 0.72 MG/DL (ref 0.57–1)
EGFRCR SERPLBLD CKD-EPI 2021: 88 ML/MIN/1.73
EOSINOPHIL # BLD AUTO: 0.1 X10E3/UL (ref 0–0.4)
EOSINOPHIL NFR BLD AUTO: 2 %
ERYTHROCYTE [DISTWIDTH] IN BLOOD BY AUTOMATED COUNT: 13.1 % (ref 11.7–15.4)
GLOBULIN SER CALC-MCNC: 2.4 G/DL (ref 1.5–4.5)
GLUCOSE SERPL-MCNC: 155 MG/DL (ref 70–99)
HCT VFR BLD AUTO: 42.5 % (ref 34–46.6)
HDLC SERPL-MCNC: 52 MG/DL
HGB BLD-MCNC: 13.9 G/DL (ref 11.1–15.9)
IMM GRANULOCYTES # BLD AUTO: 0 X10E3/UL (ref 0–0.1)
IMM GRANULOCYTES NFR BLD AUTO: 0 %
LDLC SERPL CALC-MCNC: 50 MG/DL (ref 0–99)
LYMPHOCYTES # BLD AUTO: 2.5 X10E3/UL (ref 0.7–3.1)
LYMPHOCYTES NFR BLD AUTO: 36 %
MCH RBC QN AUTO: 31.2 PG (ref 26.6–33)
MCHC RBC AUTO-ENTMCNC: 32.7 G/DL (ref 31.5–35.7)
MCV RBC AUTO: 95 FL (ref 79–97)
MONOCYTES # BLD AUTO: 0.7 X10E3/UL (ref 0.1–0.9)
MONOCYTES NFR BLD AUTO: 10 %
NEUTROPHILS # BLD AUTO: 3.6 X10E3/UL (ref 1.4–7)
NEUTROPHILS NFR BLD AUTO: 52 %
PLATELET # BLD AUTO: 359 X10E3/UL (ref 150–450)
POTASSIUM SERPL-SCNC: 4.9 MMOL/L (ref 3.5–5.2)
PROT SERPL-MCNC: 6.3 G/DL (ref 6–8.5)
RBC # BLD AUTO: 4.46 X10E6/UL (ref 3.77–5.28)
SODIUM SERPL-SCNC: 140 MMOL/L (ref 134–144)
T4 FREE SERPL-MCNC: 1.31 NG/DL (ref 0.82–1.77)
TRIGL SERPL-MCNC: 90 MG/DL (ref 0–149)
TSH SERPL DL<=0.005 MIU/L-ACNC: 1.07 UIU/ML (ref 0.45–4.5)
VIT B12 SERPL-MCNC: 1504 PG/ML (ref 232–1245)
VLDLC SERPL CALC-MCNC: 17 MG/DL (ref 5–40)
WBC # BLD AUTO: 7 X10E3/UL (ref 3.4–10.8)